# Patient Record
Sex: MALE | Race: WHITE | NOT HISPANIC OR LATINO | Employment: OTHER | URBAN - METROPOLITAN AREA
[De-identification: names, ages, dates, MRNs, and addresses within clinical notes are randomized per-mention and may not be internally consistent; named-entity substitution may affect disease eponyms.]

---

## 2023-03-21 LAB
ALANINE AMINOTRANSFERASE (SGPT) (U/L) IN SER/PLAS: 16 U/L (ref 10–52)
ALBUMIN (G/DL) IN SER/PLAS: 4.2 G/DL (ref 3.4–5)
ALKALINE PHOSPHATASE (U/L) IN SER/PLAS: 51 U/L (ref 33–136)
ANION GAP IN SER/PLAS: 17 MMOL/L (ref 10–20)
ASPARTATE AMINOTRANSFERASE (SGOT) (U/L) IN SER/PLAS: 14 U/L (ref 9–39)
BASOPHILS (10*3/UL) IN BLOOD BY AUTOMATED COUNT: 0.04 X10E9/L (ref 0–0.1)
BASOPHILS/100 LEUKOCYTES IN BLOOD BY AUTOMATED COUNT: 0.6 % (ref 0–2)
BILIRUBIN TOTAL (MG/DL) IN SER/PLAS: 1.5 MG/DL (ref 0–1.2)
CALCIUM (MG/DL) IN SER/PLAS: 9.6 MG/DL (ref 8.6–10.6)
CARBON DIOXIDE, TOTAL (MMOL/L) IN SER/PLAS: 24 MMOL/L (ref 21–32)
CHLORIDE (MMOL/L) IN SER/PLAS: 102 MMOL/L (ref 98–107)
CHOLESTEROL (MG/DL) IN SER/PLAS: 152 MG/DL (ref 0–199)
CHOLESTEROL IN HDL (MG/DL) IN SER/PLAS: 55.5 MG/DL
CHOLESTEROL/HDL RATIO: 2.7
CREATININE (MG/DL) IN SER/PLAS: 0.81 MG/DL (ref 0.5–1.3)
EOSINOPHILS (10*3/UL) IN BLOOD BY AUTOMATED COUNT: 0.2 X10E9/L (ref 0–0.4)
EOSINOPHILS/100 LEUKOCYTES IN BLOOD BY AUTOMATED COUNT: 3.1 % (ref 0–6)
ERYTHROCYTE DISTRIBUTION WIDTH (RATIO) BY AUTOMATED COUNT: 13.7 % (ref 11.5–14.5)
ERYTHROCYTE MEAN CORPUSCULAR HEMOGLOBIN CONCENTRATION (G/DL) BY AUTOMATED: 32.4 G/DL (ref 32–36)
ERYTHROCYTE MEAN CORPUSCULAR VOLUME (FL) BY AUTOMATED COUNT: 90 FL (ref 80–100)
ERYTHROCYTES (10*6/UL) IN BLOOD BY AUTOMATED COUNT: 3.89 X10E12/L (ref 4.5–5.9)
GFR MALE: >90 ML/MIN/1.73M2
GLUCOSE (MG/DL) IN SER/PLAS: 92 MG/DL (ref 74–99)
HEMATOCRIT (%) IN BLOOD BY AUTOMATED COUNT: 34.9 % (ref 41–52)
HEMOGLOBIN (G/DL) IN BLOOD: 11.3 G/DL (ref 13.5–17.5)
IMMATURE GRANULOCYTES/100 LEUKOCYTES IN BLOOD BY AUTOMATED COUNT: 0.3 % (ref 0–0.9)
LDL: 80 MG/DL (ref 0–99)
LEUKOCYTES (10*3/UL) IN BLOOD BY AUTOMATED COUNT: 6.4 X10E9/L (ref 4.4–11.3)
LYMPHOCYTES (10*3/UL) IN BLOOD BY AUTOMATED COUNT: 1.58 X10E9/L (ref 0.8–3)
LYMPHOCYTES/100 LEUKOCYTES IN BLOOD BY AUTOMATED COUNT: 24.8 % (ref 13–44)
MONOCYTES (10*3/UL) IN BLOOD BY AUTOMATED COUNT: 0.54 X10E9/L (ref 0.05–0.8)
MONOCYTES/100 LEUKOCYTES IN BLOOD BY AUTOMATED COUNT: 8.5 % (ref 2–10)
NEUTROPHILS (10*3/UL) IN BLOOD BY AUTOMATED COUNT: 3.98 X10E9/L (ref 1.6–5.5)
NEUTROPHILS/100 LEUKOCYTES IN BLOOD BY AUTOMATED COUNT: 62.7 % (ref 40–80)
NRBC (PER 100 WBCS) BY AUTOMATED COUNT: 0 /100 WBC (ref 0–0)
PLATELETS (10*3/UL) IN BLOOD AUTOMATED COUNT: 226 X10E9/L (ref 150–450)
POTASSIUM (MMOL/L) IN SER/PLAS: 3.8 MMOL/L (ref 3.5–5.3)
PROSTATE SPECIFIC AG (NG/ML) IN SER/PLAS: 1.19 NG/ML (ref 0–4)
PROTEIN TOTAL: 6.9 G/DL (ref 6.4–8.2)
SODIUM (MMOL/L) IN SER/PLAS: 139 MMOL/L (ref 136–145)
THYROTROPIN (MIU/L) IN SER/PLAS BY DETECTION LIMIT <= 0.05 MIU/L: 0.85 MIU/L (ref 0.44–3.98)
TRIGLYCERIDE (MG/DL) IN SER/PLAS: 85 MG/DL (ref 0–149)
UREA NITROGEN (MG/DL) IN SER/PLAS: 20 MG/DL (ref 6–23)
VLDL: 17 MG/DL (ref 0–40)

## 2023-04-26 ENCOUNTER — HOSPITAL ENCOUNTER (OUTPATIENT)
Dept: DATA CONVERSION | Facility: HOSPITAL | Age: 73
End: 2023-04-26
Attending: ANESTHESIOLOGY | Admitting: ANESTHESIOLOGY
Payer: MEDICARE

## 2023-04-26 DIAGNOSIS — M47.816 SPONDYLOSIS WITHOUT MYELOPATHY OR RADICULOPATHY, LUMBAR REGION: ICD-10-CM

## 2023-04-26 DIAGNOSIS — M54.16 RADICULOPATHY, LUMBAR REGION: ICD-10-CM

## 2023-08-15 LAB
6-ACETYLMORPHINE: <25 NG/ML
7-AMINOCLONAZEPAM: <25 NG/ML
ALPHA-HYDROXYALPRAZOLAM: <25 NG/ML
ALPHA-HYDROXYMIDAZOLAM: <25 NG/ML
ALPRAZOLAM: <25 NG/ML
AMPHETAMINE (PRESENCE) IN URINE BY SCREEN METHOD: ABNORMAL
BARBITURATES PRESENCE IN URINE BY SCREEN METHOD: ABNORMAL
CANNABINOIDS IN URINE BY SCREEN METHOD: ABNORMAL
CHLORDIAZEPOXIDE: <25 NG/ML
CLONAZEPAM: <25 NG/ML
COCAINE (PRESENCE) IN URINE BY SCREEN METHOD: ABNORMAL
CODEINE: <50 NG/ML
CREATINE, URINE FOR DRUG: 30.3 MG/DL
DIAZEPAM: <25 NG/ML
DRUG SCREEN COMMENT URINE: ABNORMAL
EDDP: <25 NG/ML
FENTANYL CONFIRMATION, URINE: <2.5 NG/ML
HYDROCODONE: <25 NG/ML
HYDROMORPHONE: <25 NG/ML
LORAZEPAM: <25 NG/ML
METHADONE CONFIRMATION,URINE: <25 NG/ML
MIDAZOLAM: <25 NG/ML
MORPHINE URINE: <50 NG/ML
NORDIAZEPAM: <25 NG/ML
NORFENTANYL: <2.5 NG/ML
NORHYDROCODONE: <25 NG/ML
NOROXYCODONE: 95 NG/ML
O-DESMETHYLTRAMADOL: <50 NG/ML
OXAZEPAM: <25 NG/ML
OXYCODONE: 39 NG/ML
OXYMORPHONE: 206 NG/ML
PHENCYCLIDINE (PRESENCE) IN URINE BY SCREEN METHOD: ABNORMAL
TEMAZEPAM: <25 NG/ML
TRAMADOL: <50 NG/ML
ZOLPIDEM METABOLITE (ZCA): <25 NG/ML
ZOLPIDEM: <25 NG/ML

## 2023-09-07 VITALS — BODY MASS INDEX: 22.81 KG/M2 | HEIGHT: 71 IN | WEIGHT: 162.92 LBS

## 2023-10-02 NOTE — OP NOTE
Post Operative Note:     PreOp Diagnosis: Lumbar spondylosis without myelopathy   Post-Procedure Diagnosis: Same   Procedure: 1.   2.  Radiofrequency ablation lumbar medial branch nerves L4-5 L5-S1 with fluoroscopy  3.   4.   5.   Surgeon: Dr. Taylor   Resident/Fellow/Other Assistant: None   Anesthesia: Local   Estimated Blood Loss (mL): none   Specimen: no   Findings: None     Operative Report Dictated:  Dictation: not applicable - note contains Operative  Report   Operative Report:    73-year-old  male here today for radiofrequency ablation lumbar medial branch nerves L4-5 L5-S1 with fluoroscopy.  He has responded well to medial branch  nerve blocks.  Risk and benefits of today's procedure as well as COVID-19 was discussed and a signed consent was obtained prior to entry into the OR.  Preoperative Diagnosis: Lumbar spondylosis without myelopathy L4-5 L5-S1    Operation Performed:   1. Bilateral Percutaneous localization of medial branch nerves of L4-5 L5-S1 with fluoroscopy/neural stimulation.    2. Lumbar radiofrequency ablation of the above-mentioned medial branch nerves.      Medical necessity was determined by the above-diagnosis.     Anesthesia: Local/IV Conscious Sedation    Estimated Blood Loss: none    Complications: none    CLINICAL NOTE: This patient was admitted to the pain center to undergo bilateral radiofrequency ablation of the lumbar medial branch nerves L4-5 L5-S1. The patient has had a history of severe low back pain for the past 6 months. The pain is axial in nature,  involving the buttocks and posterior thigh. Prior to being taken to the operating room, all risks and potential outcomes, side effects and complications to include dural puncture, nerve damage, bleeding, increased pain, and infection, were re-explained  to the patient, along with the rationale to perform the procedure. The patient voluntarily signed a consent form and no guarantees were made.      PROCEDURE: The  patient was taken to the operating room and placed in the prone position with proper monitoring of vital signs. Monitored anesthesia care was administered so that the patient could interact with me during the procedure.  No IV conscious  sedation was used to allay anxiety. The patient was prepped and draped in the usual sterile fashion. This skin and subcutaneous tissue were anesthetized with a 25-gauge 1-1/4 inch needle using 14 cc's of 1% lidocaine. Using multiplanar fluoroscopy guidance,  the medial branch nerves (MBN's) were individually needle-localized at L4-5 L5-S1 with a 18-gauge angulated Networker 10 mm exposed tip venom needle. I correctly placed the needle tip at the junction of the superior articular process and the transverse  process, where the mammilloaccessory ligament is located. Each RFA was performed with the exposed needle tip safely over and contacting bony margins. To further clarify and localize the anatomical targets, neurosensory stimulation of the medial branch  nerves was carried out between 50 and 75 cycles per second. I detected paraspinal muscle twitching without radicular or motor responses when stimulating for motor nerves at 2 cps up to 2 V. Appropriate sensory responses without radicular motor response  confirmed the needle tip was away from respective nerve root elements. After this was confirmed at each individual level being treated, 1 mL of 2% lidocaine was injected; Then each of the 4 medial branch nerves was individually ablated at 85°C for  60 seconds. The needles were then rotated 90° and a second RF a was performed at 85° for 60 seconds. This was followed by injecting 20 mg of Depo-Medrol +1 mL of 2% lidocaine. This was performed and both levels post ablation to overt the potential  for postoperative neuritis. The needles were then removed and sterile bandages with Neosporin ointment were applied to the puncture sites. A total of 4 sites were ablated with radiofrequency  ablation.    The patient tolerated the procedure well and remained both hemodynamically stable and neurologically intact and was transferred to the recovery room in stable condition. The patient was discharged with instructions to follow-up in the clinic in 6 weeks.          Electronic Signatures:  Mikey Taylor ()  (Signed 26-Apr-2023 11:13)   Authored: Post Operative Note, Note Completion      Last Updated: 26-Apr-2023 11:13 by Mikey Taylor ()

## 2023-11-01 ENCOUNTER — OFFICE VISIT (OUTPATIENT)
Dept: PAIN MEDICINE | Facility: CLINIC | Age: 73
End: 2023-11-01
Payer: MEDICARE

## 2023-11-01 VITALS
HEIGHT: 71 IN | DIASTOLIC BLOOD PRESSURE: 64 MMHG | TEMPERATURE: 97 F | HEART RATE: 64 BPM | SYSTOLIC BLOOD PRESSURE: 137 MMHG | RESPIRATION RATE: 18 BRPM | WEIGHT: 162 LBS | BODY MASS INDEX: 22.68 KG/M2

## 2023-11-01 DIAGNOSIS — M43.06 LUMBAR SPONDYLOLYSIS: ICD-10-CM

## 2023-11-01 DIAGNOSIS — M54.16 LUMBAR RADICULOPATHY: ICD-10-CM

## 2023-11-01 DIAGNOSIS — M47.817 MULTILEVEL LUMBOSACRAL SPONDYLOSIS WITHOUT MYELOPATHY: Primary | ICD-10-CM

## 2023-11-01 DIAGNOSIS — M96.1 POSTLAMINECTOMY SYNDROME, LUMBAR REGION: ICD-10-CM

## 2023-11-01 PROCEDURE — 1125F AMNT PAIN NOTED PAIN PRSNT: CPT | Performed by: ANESTHESIOLOGY

## 2023-11-01 PROCEDURE — 1159F MED LIST DOCD IN RCRD: CPT | Performed by: ANESTHESIOLOGY

## 2023-11-01 PROCEDURE — 99213 OFFICE O/P EST LOW 20 MIN: CPT | Performed by: ANESTHESIOLOGY

## 2023-11-01 PROCEDURE — 1036F TOBACCO NON-USER: CPT | Performed by: ANESTHESIOLOGY

## 2023-11-01 RX ORDER — LIDOCAINE 50 MG/G
PATCH TOPICAL
COMMUNITY
Start: 2022-07-27 | End: 2023-12-04 | Stop reason: WASHOUT

## 2023-11-01 RX ORDER — LOSARTAN POTASSIUM 100 MG/1
1 TABLET ORAL DAILY
COMMUNITY
Start: 2021-04-15

## 2023-11-01 RX ORDER — BUPROPION HYDROCHLORIDE 150 MG/1
150 TABLET, EXTENDED RELEASE ORAL 2 TIMES DAILY
COMMUNITY
Start: 2023-03-20 | End: 2023-12-04 | Stop reason: WASHOUT

## 2023-11-01 RX ORDER — AMITRIPTYLINE HYDROCHLORIDE 10 MG/1
1 TABLET, FILM COATED ORAL NIGHTLY
COMMUNITY
Start: 2022-08-31 | End: 2023-12-04 | Stop reason: WASHOUT

## 2023-11-01 RX ORDER — SILDENAFIL 100 MG/1
TABLET, FILM COATED ORAL AS NEEDED
COMMUNITY

## 2023-11-01 RX ORDER — SOTALOL HYDROCHLORIDE 80 MG/1
1 TABLET ORAL EVERY 12 HOURS
COMMUNITY
Start: 2017-03-08 | End: 2024-04-23

## 2023-11-01 RX ORDER — NALOXONE HYDROCHLORIDE 4 MG/.1ML
SPRAY NASAL
COMMUNITY
Start: 2021-11-17

## 2023-11-01 RX ORDER — ACETAMINOPHEN 500 MG
TABLET ORAL EVERY 6 HOURS PRN
COMMUNITY

## 2023-11-01 RX ORDER — ROSUVASTATIN CALCIUM 40 MG/1
TABLET, COATED ORAL
COMMUNITY
Start: 2023-09-05 | End: 2024-05-29 | Stop reason: SDUPTHER

## 2023-11-01 RX ORDER — FUROSEMIDE 20 MG/1
1 TABLET ORAL DAILY
COMMUNITY
Start: 2021-04-15 | End: 2024-03-11

## 2023-11-01 RX ORDER — AMLODIPINE BESYLATE 5 MG/1
1 TABLET ORAL DAILY
COMMUNITY
Start: 2017-03-08 | End: 2024-03-11

## 2023-11-01 RX ORDER — OXYCODONE AND ACETAMINOPHEN 5; 325 MG/1; MG/1
TABLET ORAL
COMMUNITY
End: 2023-11-01 | Stop reason: SDUPTHER

## 2023-11-01 RX ORDER — CETIRIZINE HYDROCHLORIDE 10 MG/1
1 TABLET ORAL DAILY
COMMUNITY

## 2023-11-01 RX ORDER — OXYCODONE AND ACETAMINOPHEN 5; 325 MG/1; MG/1
1 TABLET ORAL EVERY 8 HOURS PRN
Qty: 90 TABLET | Refills: 0 | Status: SHIPPED | OUTPATIENT
Start: 2023-11-01 | End: 2023-12-01

## 2023-11-01 SDOH — ECONOMIC STABILITY: FOOD INSECURITY: WITHIN THE PAST 12 MONTHS, YOU WORRIED THAT YOUR FOOD WOULD RUN OUT BEFORE YOU GOT MONEY TO BUY MORE.: NEVER TRUE

## 2023-11-01 SDOH — ECONOMIC STABILITY: FOOD INSECURITY: WITHIN THE PAST 12 MONTHS, THE FOOD YOU BOUGHT JUST DIDN'T LAST AND YOU DIDN'T HAVE MONEY TO GET MORE.: NEVER TRUE

## 2023-11-01 ASSESSMENT — COLUMBIA-SUICIDE SEVERITY RATING SCALE - C-SSRS
6. HAVE YOU EVER DONE ANYTHING, STARTED TO DO ANYTHING, OR PREPARED TO DO ANYTHING TO END YOUR LIFE?: NO
2. HAVE YOU ACTUALLY HAD ANY THOUGHTS OF KILLING YOURSELF?: NO
1. IN THE PAST MONTH, HAVE YOU WISHED YOU WERE DEAD OR WISHED YOU COULD GO TO SLEEP AND NOT WAKE UP?: NO

## 2023-11-01 ASSESSMENT — PATIENT HEALTH QUESTIONNAIRE - PHQ9
2. FEELING DOWN, DEPRESSED OR HOPELESS: NOT AT ALL
1. LITTLE INTEREST OR PLEASURE IN DOING THINGS: NOT AT ALL
SUM OF ALL RESPONSES TO PHQ9 QUESTIONS 1 & 2: 0

## 2023-11-01 ASSESSMENT — LIFESTYLE VARIABLES: HOW OFTEN DO YOU HAVE A DRINK CONTAINING ALCOHOL: NEVER

## 2023-11-01 ASSESSMENT — ENCOUNTER SYMPTOMS
MYALGIAS: 1
BACK PAIN: 1

## 2023-11-01 ASSESSMENT — PAIN SCALES - GENERAL: PAINLEVEL: 8

## 2023-11-01 NOTE — PROGRESS NOTES
Subjective   Gaurav Rehman is a 73 y.o. male who returns for follow-up medication management.  Patient takes 2 Percocet 5/325 mg tablets a day to moderate his pain.  Analgesia: Pain level is 5-6 on a 1-10 scale which is stable  Activity Level: Patient is able to perform activities of daily living without great difficulty when using his medications on a daily basis.    Review of Systems   Musculoskeletal:  Positive for back pain, gait problem and myalgias.   All other systems reviewed and are negative.      Objective   Physical Exam   Gen. appearance:  Patient's alert and oriented ×3 ;cooperative mild to moderate distress  Heart: Regular rate and rhythm  Lungs: Clear to auscultation  Abdomen: Soft nontender  Neuro: Cranial nerves II -XII intact; DTR: +2 over 4 biceps triceps brachial radialis patellar and Achilles  Spinal exam: Positive paraspinal tenderness noted bilaterally L4 5 L5-S1 with flexion extension and rotation/no bony abnormalities  Musculoskeletal:  Upper and lower extremity strength was 4/5 bilaterally  :  deferred  Skin: Warm and dry      Assessment:   1. Lumbar radiculopathy        2. Postlaminectomy syndrome, lumbar region            Plan:  Risk and benefits continuous opioids for pain control was discussed.  Patient was E prescribed Percocet 5/325 mg tablets #90 which is 2 a day for 7-week follow-up visit for a scheduled radiofrequency ablation lumbar medial branch nerves on December 20, 2023.

## 2023-11-01 NOTE — PROGRESS NOTES
Pain #8/10 to his back, right hip going down the leg that comes and goes.  This is chronic.  Pain increases with yard work, chores. Takes 2 Percocet per day and has 6 tablets left.  This reduces his pain by 75 %.

## 2023-12-01 PROBLEM — M47.812 FACET ARTHRITIS OF CERVICAL REGION: Status: ACTIVE | Noted: 2023-12-01

## 2023-12-01 PROBLEM — M47.816 FACET DEGENERATION OF LUMBAR REGION: Status: ACTIVE | Noted: 2023-12-01

## 2023-12-01 PROBLEM — M25.551 ARTHRALGIA OF HIP, RIGHT: Status: ACTIVE | Noted: 2023-12-01

## 2023-12-01 PROBLEM — I10 HYPERTENSION: Status: ACTIVE | Noted: 2023-12-01

## 2023-12-01 PROBLEM — B02.29 POST HERPETIC NEURALGIA: Status: ACTIVE | Noted: 2023-12-01

## 2023-12-01 PROBLEM — I47.19 PAROXYSMAL ATRIAL TACHYCARDIA (CMS-HCC): Status: ACTIVE | Noted: 2023-12-01

## 2023-12-01 PROBLEM — I71.21 ASCENDING AORTIC ANEURYSM (CMS-HCC): Status: ACTIVE | Noted: 2023-12-01

## 2023-12-01 PROBLEM — R53.83 FATIGUE: Status: ACTIVE | Noted: 2023-12-01

## 2023-12-01 PROBLEM — E78.5 HYPERLIPIDEMIA: Status: ACTIVE | Noted: 2023-12-01

## 2023-12-01 PROBLEM — K59.00 CONSTIPATION: Status: ACTIVE | Noted: 2023-12-01

## 2023-12-01 PROBLEM — I07.1 MODERATE TRICUSPID REGURGITATION: Status: ACTIVE | Noted: 2023-12-01

## 2023-12-01 PROBLEM — I35.1 MILD AORTIC REGURGITATION: Status: ACTIVE | Noted: 2023-12-01

## 2023-12-01 PROBLEM — R06.02 SHORTNESS OF BREATH: Status: ACTIVE | Noted: 2023-12-01

## 2023-12-01 PROBLEM — I34.0 MODERATE MITRAL REGURGITATION: Status: ACTIVE | Noted: 2023-12-01

## 2023-12-03 NOTE — PROGRESS NOTES
Subjective   Gaurav Rehman is a 73 y.o. male.    Chief Complaint:  Follow-up valvular heart disease.  Follow-up sinus of Valsalva aneurysm.    HPI:    Over the past 6 months he has done well.  No chest discomfort or chest pressure.  Remains active and does bowl on a regular basis.  Tries to get uncertain level of exercising every day.  Weights have been stable.  Watches his diet.  No hospitalizations or other medical problems since his last visit.    Cardiac problems include moderate mitral regurgitation, moderate tricuspid regurgitation, supraventricular tachycardia, ascending aortic aneurysm, mild aortic regurgitation.     The patient has a history of sinus of Valsalva aneurysm. This measures around 4.7 cm. This has remained unchanged. He has a paroxysmal atrial tachycardia which is well controlled on sotalol therapy. He has a history of hypertension and hyperlipidemia.     He has a past history of a paroxysmal atrial tachycardia. He was referred for ablation a few years ago. However this was not successful. He was started on sotalol therapy and has been on sotalol therapy for a few years.     He has a history of complex congenital heart repair in 1995. He had a repair of ventricular septal defect. He had treatment of pulmonic stenosis. He had aortic root surgery with implantation of the coronary arteries.    Allergies  Medication    · No Known Drug Allergies   Recorded By: Laurie Spears; 11/16/2017 11:52:21 AM     Family History  Mother    · Family history of emphysema (V17.6) (Z82.5)   · Family history of throat cancer (V16.0) (Z80.0)  Father    · Family history of throat cancer (V16.0) (Z80.0)  Other    · No family history of cardiac disease     Social History  Problems    · Former smoker (V15.82) (Z87.891)   ·    · No illicit drug use   · Occasional alcohol use      Review of Systems   Cardiovascular:  Positive for dyspnea on exertion.   Musculoskeletal:  Positive for arthritis.   All other systems  reviewed and are negative.      Visit Vitals  /78 (BP Location: Right arm, Patient Position: Sitting)   Pulse 70   Wt 79.4 kg (175 lb)   SpO2 97%   BMI 24.41 kg/m²   Smoking Status Never   BSA 1.99 m²        Objective     Constitutional:       Appearance: Not in distress.   Neck:      Vascular: JVD normal.   Pulmonary:      Breath sounds: Normal breath sounds.   Cardiovascular:      Normal rate. Regular rhythm. S1 with normal intensity. S2 with normal intensity.       Murmurs: There is no murmur.      No gallop.    Pulses:     Intact distal pulses.   Edema:     Peripheral edema absent.   Abdominal:      General: Bowel sounds are normal.   Neurological:      Mental Status: Alert and oriented to person, place and time.         Lab Review:   Lab Results   Component Value Date     03/21/2023    K 3.8 03/21/2023     03/21/2023    CO2 24 03/21/2023    BUN 20 03/21/2023    CREATININE 0.81 03/21/2023    GLUCOSE 92 03/21/2023    CALCIUM 9.6 03/21/2023     Lab Results   Component Value Date    CHOL 152 03/21/2023    TRIG 85 03/21/2023    HDL 55.5 03/21/2023       Assessment:    1.  Sinus Valsalva aneurysm.  Will repeat echo study on his next visit.    2.  Paroxysmal atrial tachycardia.  On sotalol therapy.  Tolerating it well.  Has not had any breakthrough arrhythmias.    3.  Hyperlipidemia.  Most recent LDL is 80.  Previous LDL was 60.  He is on rosuvastatin 40 mg daily.    4.  Hypertension.  Blood pressures are normal.    5.  Valvular heart disease.  Moderate mitral regurgitation and moderate tricuspid regurgitation.  No evidence of left heart failure or right heart failure on today's examination.

## 2023-12-04 ENCOUNTER — OFFICE VISIT (OUTPATIENT)
Dept: CARDIOLOGY | Facility: CLINIC | Age: 73
End: 2023-12-04
Payer: MEDICARE

## 2023-12-04 VITALS
OXYGEN SATURATION: 97 % | DIASTOLIC BLOOD PRESSURE: 78 MMHG | HEART RATE: 70 BPM | BODY MASS INDEX: 24.41 KG/M2 | WEIGHT: 175 LBS | SYSTOLIC BLOOD PRESSURE: 122 MMHG

## 2023-12-04 DIAGNOSIS — E78.49 OTHER HYPERLIPIDEMIA: ICD-10-CM

## 2023-12-04 DIAGNOSIS — I34.0 MODERATE MITRAL REGURGITATION: ICD-10-CM

## 2023-12-04 DIAGNOSIS — I35.1 MILD AORTIC REGURGITATION: ICD-10-CM

## 2023-12-04 DIAGNOSIS — I71.21 ANEURYSM OF ASCENDING AORTA WITHOUT RUPTURE (CMS-HCC): Primary | ICD-10-CM

## 2023-12-04 DIAGNOSIS — I07.1 MODERATE TRICUSPID REGURGITATION: ICD-10-CM

## 2023-12-04 DIAGNOSIS — I47.19 PAROXYSMAL ATRIAL TACHYCARDIA (CMS-HCC): ICD-10-CM

## 2023-12-04 PROCEDURE — 1125F AMNT PAIN NOTED PAIN PRSNT: CPT | Performed by: INTERNAL MEDICINE

## 2023-12-04 PROCEDURE — 3074F SYST BP LT 130 MM HG: CPT | Performed by: INTERNAL MEDICINE

## 2023-12-04 PROCEDURE — 1159F MED LIST DOCD IN RCRD: CPT | Performed by: INTERNAL MEDICINE

## 2023-12-04 PROCEDURE — 1036F TOBACCO NON-USER: CPT | Performed by: INTERNAL MEDICINE

## 2023-12-04 PROCEDURE — 99214 OFFICE O/P EST MOD 30 MIN: CPT | Performed by: INTERNAL MEDICINE

## 2023-12-04 PROCEDURE — 3078F DIAST BP <80 MM HG: CPT | Performed by: INTERNAL MEDICINE

## 2023-12-04 ASSESSMENT — ENCOUNTER SYMPTOMS: DYSPNEA ON EXERTION: 1

## 2023-12-20 ENCOUNTER — HOSPITAL ENCOUNTER (OUTPATIENT)
Dept: PAIN MEDICINE | Facility: CLINIC | Age: 73
Discharge: HOME | End: 2023-12-20
Payer: MEDICARE

## 2023-12-20 ENCOUNTER — ANCILLARY PROCEDURE (OUTPATIENT)
Dept: RADIOLOGY | Facility: CLINIC | Age: 73
End: 2023-12-20
Payer: MEDICARE

## 2023-12-20 VITALS
OXYGEN SATURATION: 98 % | HEART RATE: 74 BPM | TEMPERATURE: 97.7 F | DIASTOLIC BLOOD PRESSURE: 69 MMHG | SYSTOLIC BLOOD PRESSURE: 127 MMHG | RESPIRATION RATE: 18 BRPM

## 2023-12-20 DIAGNOSIS — M47.817 MULTILEVEL LUMBOSACRAL SPONDYLOSIS WITHOUT MYELOPATHY: ICD-10-CM

## 2023-12-20 DIAGNOSIS — M96.1 POSTLAMINECTOMY SYNDROME, LUMBAR REGION: ICD-10-CM

## 2023-12-20 DIAGNOSIS — M54.16 RADICULOPATHY, LUMBAR REGION: ICD-10-CM

## 2023-12-20 PROCEDURE — 77003 FLUOROGUIDE FOR SPINE INJECT: CPT

## 2023-12-20 PROCEDURE — 2500000005 HC RX 250 GENERAL PHARMACY W/O HCPCS

## 2023-12-20 PROCEDURE — 64635 DESTROY LUMB/SAC FACET JNT: CPT | Performed by: ANESTHESIOLOGY

## 2023-12-20 PROCEDURE — 2500000004 HC RX 250 GENERAL PHARMACY W/ HCPCS (ALT 636 FOR OP/ED)

## 2023-12-20 PROCEDURE — 64636 DESTROY L/S FACET JNT ADDL: CPT | Performed by: ANESTHESIOLOGY

## 2023-12-20 PROCEDURE — 64636 DESTROY L/S FACET JNT ADDL: CPT | Mod: 50,KX | Performed by: ANESTHESIOLOGY

## 2023-12-20 PROCEDURE — 64635 DESTROY LUMB/SAC FACET JNT: CPT | Mod: 50 | Performed by: ANESTHESIOLOGY

## 2023-12-20 RX ORDER — LIDOCAINE HYDROCHLORIDE 20 MG/ML
INJECTION, SOLUTION EPIDURAL; INFILTRATION; INTRACAUDAL; PERINEURAL
Status: COMPLETED
Start: 2023-12-20 | End: 2023-12-20

## 2023-12-20 RX ORDER — LIDOCAINE HYDROCHLORIDE 10 MG/ML
INJECTION, SOLUTION EPIDURAL; INFILTRATION; INTRACAUDAL; PERINEURAL
Status: COMPLETED
Start: 2023-12-20 | End: 2023-12-20

## 2023-12-20 RX ORDER — OXYCODONE AND ACETAMINOPHEN 5; 325 MG/1; MG/1
TABLET ORAL
COMMUNITY
End: 2023-12-20 | Stop reason: SDUPTHER

## 2023-12-20 RX ORDER — METHYLPREDNISOLONE ACETATE 80 MG/ML
INJECTION, SUSPENSION INTRA-ARTICULAR; INTRALESIONAL; INTRAMUSCULAR; SOFT TISSUE
Status: COMPLETED
Start: 2023-12-20 | End: 2023-12-20

## 2023-12-20 RX ORDER — OXYCODONE AND ACETAMINOPHEN 5; 325 MG/1; MG/1
1 TABLET ORAL EVERY 8 HOURS PRN
Qty: 90 TABLET | Refills: 0 | Status: SHIPPED | OUTPATIENT
Start: 2023-12-20 | End: 2024-01-19

## 2023-12-20 RX ADMIN — METHYLPREDNISOLONE ACETATE 80 MG: 80 INJECTION, SUSPENSION INTRA-ARTICULAR; INTRALESIONAL; INTRAMUSCULAR; SOFT TISSUE at 11:02

## 2023-12-20 RX ADMIN — LIDOCAINE HYDROCHLORIDE 300 MG: 10 INJECTION, SOLUTION EPIDURAL; INFILTRATION; INTRACAUDAL; PERINEURAL at 11:01

## 2023-12-20 RX ADMIN — LIDOCAINE HYDROCHLORIDE 100 MG: 20 INJECTION, SOLUTION EPIDURAL; INFILTRATION; INTRACAUDAL; PERINEURAL at 11:02

## 2023-12-20 ASSESSMENT — PAIN - FUNCTIONAL ASSESSMENT: PAIN_FUNCTIONAL_ASSESSMENT: 0-10

## 2023-12-20 ASSESSMENT — PAIN SCALES - GENERAL
PAINLEVEL_OUTOF10: 7
PAINLEVEL_OUTOF10: 8

## 2023-12-20 NOTE — OP NOTE
Date: 2023  OR Location: [facility] NON-OR PROCEDURES    Name: Gaurav Rehman, : 1950, Age: 73 y.o., MRN: [id number], Sex: male    Diagnosis   1. Postlaminectomy syndrome, lumbar region  Radiofrequency Ablation    Radiofrequency Ablation      2. Multilevel lumbosacral spondylosis without myelopathy  Radiofrequency Ablation    Radiofrequency Ablation        Preoperative Diagnosis: Lumbar spondylosis without myelopathy L4-5 L5-S1    Operation Performed:   1. Bilateral Percutaneous localization of medial branch nerves of L4-5 L5-S1 with fluoroscopy/neural stimulation.    2. Lumbar radiofrequency ablation of the above-mentioned medial branch nerves.      Procedures     Medical necessity was determined by the above-diagnosis.     Anesthesia: Local rehab    Estimated Blood Loss: none    Complications: none    CLINICAL NOTE: This patient was admitted to the pain center to undergo bilateral radiofrequency ablation of the lumbar medial branch nerves L4-5 L5-S1 the patient has had a history of severe low back pain for the past 10 months. The pain is axial in nature, involving the buttocks and posterior thigh. Prior to being taken to the operating room, all risks and potential outcomes, side effects and complications to include dural puncture, nerve damage, bleeding, increased pain, and infection, were re-explained to the patient, along with the rationale to perform the procedure. The patient voluntarily signed a consent form and no guarantees were made.      PROCEDURE: The 73 y.o. male patient was taken to the operating room and placed in the prone position with proper monitoring of vital signs. Monitored anesthesia care was administered so that the patient could interact with me during the procedure.  No IV conscious sedation was used to allay anxiety. The patient was prepped and draped in the usual sterile fashion. This skin and subcutaneous tissue were anesthetized with a 25-gauge 1-1/4 inch needle using 14 cc's of 1%  lidocaine. Using multiplanar fluoroscopy guidance, the medial branch nerves (MBN's) were individually needle-localized at L4-5 L5-S1 with a 18-gauge angulated Flashstock 10 mm exposed tip venom needle. I correctly placed the needle tip at the junction of the superior articular process and the transverse process, where the mammilloaccessory ligament is located. Each RFA was performed with the exposed needle tip safely over and contacting bony margins. To further clarify and localize the anatomical targets, neurosensory stimulation of the medial branch nerves was carried out between 50 and 75 cycles per second. I detected paraspinal muscle twitching without radicular or motor responses when stimulating for motor nerves at 2 cps up to 2 V. Appropriate sensory responses without radicular motor response confirmed the needle tip was away from respective nerve root elements. After this was confirmed at each individual level being treated, 1 mL of 2% lidocaine was injected; Then each of the 4 medial branch nerves was individually ablated at 85°C for 60 seconds. The needles were then rotated 90° and a second RF a was performed at 85° for 60 seconds. This was followed by injecting 20 mg of Depo-Medrol +1 mL of 2% lidocaine. This was performed and both levels post ablation to overt the potential for postoperative neuritis. The needles were then removed and sterile bandages with Neosporin ointment were applied to the puncture sites. A total of 4 sites were ablated with radiofrequency ablation.    The patient tolerated the procedure well and remained both hemodynamically stable and neurologically intact and was transferred to the recovery room in stable condition. The patient was discharged with instructions to follow-up in the clinic in 4-6 weeks.

## 2023-12-20 NOTE — H&P
History Of Present Illness  Gaurav Rehman is a 73 y.o. male presenting with chronic low back pain.     Past Medical History  Past Medical History:   Diagnosis Date    Essential (primary) hypertension 12/18/2022    Hypertension, unspecified type       Surgical History  Past Surgical History:   Procedure Laterality Date    BACK SURGERY  11/16/2017    Back Surgery    OTHER SURGICAL HISTORY  11/16/2017    Aortic Root Transloc, Repair VSD & Pulm Stenosis, Coronary Ostium Reimplant    ROTATOR CUFF REPAIR  11/16/2017    Rotator Cuff Repair        Social History  He reports that he has never smoked. He has never used smokeless tobacco. He reports that he does not currently use alcohol. He reports current drug use. Drug: Oxycodone.    Family History  No family history on file.     Allergies  Patient has no known allergies.    Review of Systems     Physical Exam     Last Recorded Vitals  There were no vitals taken for this visit.    Relevant Results        Denies any chest pain or shortness of breath or changes in his health since his last visit on 11/1/2023.  Risk and benefits of radiofrequency ablation of the lumbar medial branch nerves was discussed with the patient at length and he voluntarily signed a consent form prior to entering the OR.     Assessment/Plan   Active Problems:  There are no active Hospital Problems.      Patient has diagnostic medial branch nerve blocks done previously with 80% pain relief.  Patient scheduled today for RFA of his medial branch nerves.  Electronic prescription of Percocet 5 mg tablets was sent to his pharmacy.       I spent 10 minutes in the professional and overall care of this patient.      Mikey Taylor DO

## 2024-01-31 ENCOUNTER — OFFICE VISIT (OUTPATIENT)
Dept: PAIN MEDICINE | Facility: CLINIC | Age: 74
End: 2024-01-31
Payer: MEDICARE

## 2024-01-31 VITALS
TEMPERATURE: 97.2 F | BODY MASS INDEX: 22.4 KG/M2 | WEIGHT: 160 LBS | SYSTOLIC BLOOD PRESSURE: 160 MMHG | HEART RATE: 63 BPM | RESPIRATION RATE: 18 BRPM | HEIGHT: 71 IN | DIASTOLIC BLOOD PRESSURE: 68 MMHG

## 2024-01-31 DIAGNOSIS — M47.817 MULTILEVEL LUMBOSACRAL SPONDYLOSIS WITHOUT MYELOPATHY: Primary | ICD-10-CM

## 2024-01-31 DIAGNOSIS — M96.1 POSTLAMINECTOMY SYNDROME, LUMBAR REGION: ICD-10-CM

## 2024-01-31 PROCEDURE — 1036F TOBACCO NON-USER: CPT | Performed by: ANESTHESIOLOGY

## 2024-01-31 PROCEDURE — 1159F MED LIST DOCD IN RCRD: CPT | Performed by: ANESTHESIOLOGY

## 2024-01-31 PROCEDURE — 1125F AMNT PAIN NOTED PAIN PRSNT: CPT | Performed by: ANESTHESIOLOGY

## 2024-01-31 PROCEDURE — 3077F SYST BP >= 140 MM HG: CPT | Performed by: ANESTHESIOLOGY

## 2024-01-31 PROCEDURE — 99213 OFFICE O/P EST LOW 20 MIN: CPT | Performed by: ANESTHESIOLOGY

## 2024-01-31 PROCEDURE — 1160F RVW MEDS BY RX/DR IN RCRD: CPT | Performed by: ANESTHESIOLOGY

## 2024-01-31 PROCEDURE — 3078F DIAST BP <80 MM HG: CPT | Performed by: ANESTHESIOLOGY

## 2024-01-31 RX ORDER — OXYCODONE AND ACETAMINOPHEN 5; 325 MG/1; MG/1
1 TABLET ORAL EVERY 8 HOURS PRN
Qty: 90 TABLET | Refills: 0 | Status: SHIPPED | OUTPATIENT
Start: 2024-01-31 | End: 2024-03-01

## 2024-01-31 ASSESSMENT — ENCOUNTER SYMPTOMS
LOSS OF SENSATION IN FEET: 0
OCCASIONAL FEELINGS OF UNSTEADINESS: 0
DEPRESSION: 0

## 2024-01-31 ASSESSMENT — COLUMBIA-SUICIDE SEVERITY RATING SCALE - C-SSRS
1. IN THE PAST MONTH, HAVE YOU WISHED YOU WERE DEAD OR WISHED YOU COULD GO TO SLEEP AND NOT WAKE UP?: NO
2. HAVE YOU ACTUALLY HAD ANY THOUGHTS OF KILLING YOURSELF?: NO
6. HAVE YOU EVER DONE ANYTHING, STARTED TO DO ANYTHING, OR PREPARED TO DO ANYTHING TO END YOUR LIFE?: NO

## 2024-01-31 ASSESSMENT — PAIN SCALES - GENERAL: PAINLEVEL: 8

## 2024-01-31 NOTE — PROGRESS NOTES
Pain #8/10 lower back, right hip.  RFA on 12-20-23 gave him only a couple of weeks relief and pain has now returned.  Pain increases with getting out of bed in the morning and any strenuous activity.  Takes 2 Percocet per day and has 6 tablets left.  This reduces his pain by at least 70%.

## 2024-01-31 NOTE — PROGRESS NOTES
Subjective   Gaurav Rehman is a 73 y.o. male who returns for follow-up following a radiofrequency ablation lumbar medial branch nerves at L4-5 L5-S1 on 12/20/2023.  Patient has had significant chronic low back pain secondary to facet arthrosis as well is failed lumbar laminectomy.  He takes Percocet 2 a day to moderate his pain.  OARRS report shows his active cumulated morphine equivalent of 12.8.  Analgesia: Pain level is 5-7 on a 1-10 scale which is stable.  Activity Level: Activity level is normal when using his medication on a regular basis.  Patient also uses extra strength Tylenol for breakthrough pain    Review of Systems  Unremarkable except for chief complaint    Objective   Physical Exam   Gen. appearance:  Patient's alert and oriented ×3 ;cooperative mild to moderate distress  Heart: Regular rate and rhythm  Lungs: Clear to auscultation  Abdomen: Soft nontender  Neuro: Cranial nerves II -XII intact; DTR: +2 over 4 biceps triceps brachial radialis patellar and Achilles  Spinal exam: Positive paraspinal tenderness noted bilaterally L4 5 L5-S1 with flexion extension and rotation/no bony abnormalities  Musculoskeletal:  Upper and lower extremity strength was 4/5 bilaterally  :  deferred  Skin: Warm and dry      Assessment:   1. Multilevel lumbosacral spondylosis without myelopathy  oxyCODONE-acetaminophen (Percocet) 5-325 mg tablet    Follow Up In Pain Medicine      2. Postlaminectomy syndrome, lumbar region  oxyCODONE-acetaminophen (Percocet) 5-325 mg tablet    Follow Up In Pain Medicine          Plan:  Risk and benefits of continued use of opioids for pain management was discussed.  Patient was e-prescribed Percocet 5/325 mg tablets #90 or 1 every 8-12 hours as needed pain.  He has not follow-up visit scheduled on March 14, 2024.  Patient was specifically instructed to keep all medication in a secure location at all times and to take them only as directed.     5-Fu Pregnancy And Lactation Text: This medication is Pregnancy Category X and contraindicated in pregnancy and in women who may become pregnant. It is unknown if this medication is excreted in breast milk.

## 2024-02-01 ENCOUNTER — APPOINTMENT (OUTPATIENT)
Dept: PAIN MEDICINE | Facility: CLINIC | Age: 74
End: 2024-02-01
Payer: MEDICARE

## 2024-03-10 DIAGNOSIS — I10 PRIMARY HYPERTENSION: ICD-10-CM

## 2024-03-11 RX ORDER — AMLODIPINE BESYLATE 5 MG/1
5 TABLET ORAL DAILY
Qty: 90 TABLET | Refills: 3 | Status: SHIPPED | OUTPATIENT
Start: 2024-03-11

## 2024-03-11 RX ORDER — FUROSEMIDE 20 MG/1
20 TABLET ORAL DAILY
Qty: 90 TABLET | Refills: 3 | Status: SHIPPED | OUTPATIENT
Start: 2024-03-11

## 2024-03-14 ENCOUNTER — OFFICE VISIT (OUTPATIENT)
Dept: PAIN MEDICINE | Facility: CLINIC | Age: 74
End: 2024-03-14
Payer: MEDICARE

## 2024-03-14 VITALS
WEIGHT: 162 LBS | OXYGEN SATURATION: 96 % | HEIGHT: 71 IN | HEART RATE: 65 BPM | SYSTOLIC BLOOD PRESSURE: 134 MMHG | BODY MASS INDEX: 22.68 KG/M2 | RESPIRATION RATE: 18 BRPM | DIASTOLIC BLOOD PRESSURE: 71 MMHG | TEMPERATURE: 97.5 F

## 2024-03-14 DIAGNOSIS — M54.16 LUMBAR RADICULOPATHY: Primary | ICD-10-CM

## 2024-03-14 DIAGNOSIS — M48.061 SPINAL STENOSIS, LUMBAR REGION, WITHOUT NEUROGENIC CLAUDICATION: ICD-10-CM

## 2024-03-14 DIAGNOSIS — M96.1 POSTLAMINECTOMY SYNDROME, LUMBAR REGION: ICD-10-CM

## 2024-03-14 DIAGNOSIS — Z79.899 MEDICATION MANAGEMENT: ICD-10-CM

## 2024-03-14 PROCEDURE — 3075F SYST BP GE 130 - 139MM HG: CPT | Performed by: ANESTHESIOLOGY

## 2024-03-14 PROCEDURE — 1125F AMNT PAIN NOTED PAIN PRSNT: CPT | Performed by: ANESTHESIOLOGY

## 2024-03-14 PROCEDURE — 99213 OFFICE O/P EST LOW 20 MIN: CPT | Performed by: ANESTHESIOLOGY

## 2024-03-14 PROCEDURE — 1160F RVW MEDS BY RX/DR IN RCRD: CPT | Performed by: ANESTHESIOLOGY

## 2024-03-14 PROCEDURE — 1159F MED LIST DOCD IN RCRD: CPT | Performed by: ANESTHESIOLOGY

## 2024-03-14 PROCEDURE — 1036F TOBACCO NON-USER: CPT | Performed by: ANESTHESIOLOGY

## 2024-03-14 PROCEDURE — 3078F DIAST BP <80 MM HG: CPT | Performed by: ANESTHESIOLOGY

## 2024-03-14 RX ORDER — OXYCODONE AND ACETAMINOPHEN 5; 325 MG/1; MG/1
1 TABLET ORAL EVERY 8 HOURS PRN
Qty: 90 TABLET | Refills: 0 | Status: SHIPPED | OUTPATIENT
Start: 2024-03-14 | End: 2024-04-13

## 2024-03-14 ASSESSMENT — ENCOUNTER SYMPTOMS
DEPRESSION: 0
PAIN: 1
BACK PAIN: 1
OCCASIONAL FEELINGS OF UNSTEADINESS: 0
LOSS OF SENSATION IN FEET: 0

## 2024-03-14 ASSESSMENT — PAIN SCALES - GENERAL: PAINLEVEL: 8

## 2024-03-14 NOTE — PROGRESS NOTES
Pain #8/10 to lower back and right hip.  Back pain is constant.  Taking 1 1/2-2 Percocet per day which gives him 75% pain relief.  Currently has 5 tablets left.

## 2024-03-14 NOTE — PROGRESS NOTES
Subjective   Patient ID: Gaurav Rehman is a 73 y.o. male who presents for medication management.  Patient's had a previous lumbar laminectomy and has had chronic low back pain with radicular symptoms.  He has advanced facet arthrosis lumbar spine L4-5 L5-S1 level.  OARRS report was reviewed and pain management agreement is up-to-date patient has 5 tablets remaining.  I urged him to continue to take 1-1/2 to maximum 2 tablets a day to moderate his pain.  Pain      Chronic low back pain secondary to previous herniated disc; acquired spondylolisthesis; history of lumbosacral radiculopathy  Review of Systems   Musculoskeletal:  Positive for back pain and gait problem.   All other systems reviewed and are negative.      Objective   Physical Exam  Gen. appearance:  Patient's alert and oriented ×3 ;cooperative mild to moderate distress  Heart: Regular rate and rhythm  Lungs: Clear to auscultation  Abdomen: Soft nontender  Neuro: Cranial nerves II -XII intact; DTR: +2 over 4 biceps triceps brachial radialis patellar and Achilles  Spinal exam: Positive paraspinal tenderness noted bilaterally L4 5 L5-S1 with flexion extension and rotation/no bony abnormalities  Musculoskeletal:  Upper and lower extremity strength was 4/5 bilaterally  :  deferred  Skin: Warm and dry      Assessment/Plan   Diagnoses and all orders for this visit:  Lumbar radiculopathy  Postlaminectomy syndrome, lumbar region  -     oxyCODONE-acetaminophen (Percocet) 5-325 mg tablet; Take 1 tablet by mouth every 8 hours if needed for severe pain (7 - 10).  -     Follow Up In Pain Medicine; Future  Spinal stenosis, lumbar region, without neurogenic claudication  -     oxyCODONE-acetaminophen (Percocet) 5-325 mg tablet; Take 1 tablet by mouth every 8 hours if needed for severe pain (7 - 10).  -     Follow Up In Pain Medicine; Future  Medication management  -     oxyCODONE-acetaminophen (Percocet) 5-325 mg tablet; Take 1 tablet by mouth every 8 hours if needed  for severe pain (7 - 10).  -     Follow Up In Pain Medicine; Future  Patient was e-prescribed oxycodone 5/325 number 90 tablets.  The take between 1 and 3 tablets a day and has a scheduled follow-up visit in 8 weeks on May 9, 2024.       Mikey Taylor DO 03/14/24 11:48 AM

## 2024-04-08 ENCOUNTER — TELEPHONE (OUTPATIENT)
Dept: PAIN MEDICINE | Facility: CLINIC | Age: 74
End: 2024-04-08
Payer: MEDICARE

## 2024-04-17 DIAGNOSIS — I47.19 PAROXYSMAL ATRIAL TACHYCARDIA (CMS-HCC): ICD-10-CM

## 2024-04-23 RX ORDER — SOTALOL HYDROCHLORIDE 80 MG/1
80 TABLET ORAL EVERY 12 HOURS
Qty: 180 TABLET | Refills: 3 | Status: SHIPPED | OUTPATIENT
Start: 2024-04-23

## 2024-05-08 ENCOUNTER — OFFICE VISIT (OUTPATIENT)
Dept: PAIN MEDICINE | Facility: CLINIC | Age: 74
End: 2024-05-08
Payer: MEDICARE

## 2024-05-08 VITALS
SYSTOLIC BLOOD PRESSURE: 135 MMHG | OXYGEN SATURATION: 96 % | HEART RATE: 64 BPM | WEIGHT: 162 LBS | HEIGHT: 71 IN | BODY MASS INDEX: 22.68 KG/M2 | TEMPERATURE: 98.4 F | DIASTOLIC BLOOD PRESSURE: 68 MMHG | RESPIRATION RATE: 18 BRPM

## 2024-05-08 DIAGNOSIS — M54.16 LUMBAR RADICULOPATHY: Primary | ICD-10-CM

## 2024-05-08 DIAGNOSIS — M96.1 POSTLAMINECTOMY SYNDROME, LUMBAR REGION: ICD-10-CM

## 2024-05-08 DIAGNOSIS — Z79.899 MEDICATION MANAGEMENT: ICD-10-CM

## 2024-05-08 DIAGNOSIS — M47.816 FACET DEGENERATION OF LUMBAR REGION: ICD-10-CM

## 2024-05-08 PROCEDURE — 1036F TOBACCO NON-USER: CPT | Performed by: ANESTHESIOLOGY

## 2024-05-08 PROCEDURE — 1125F AMNT PAIN NOTED PAIN PRSNT: CPT | Performed by: ANESTHESIOLOGY

## 2024-05-08 PROCEDURE — 99213 OFFICE O/P EST LOW 20 MIN: CPT | Performed by: ANESTHESIOLOGY

## 2024-05-08 PROCEDURE — 3075F SYST BP GE 130 - 139MM HG: CPT | Performed by: ANESTHESIOLOGY

## 2024-05-08 PROCEDURE — 3078F DIAST BP <80 MM HG: CPT | Performed by: ANESTHESIOLOGY

## 2024-05-08 PROCEDURE — 1159F MED LIST DOCD IN RCRD: CPT | Performed by: ANESTHESIOLOGY

## 2024-05-08 PROCEDURE — 1160F RVW MEDS BY RX/DR IN RCRD: CPT | Performed by: ANESTHESIOLOGY

## 2024-05-08 RX ORDER — OXYCODONE AND ACETAMINOPHEN 5; 325 MG/1; MG/1
1 TABLET ORAL EVERY 8 HOURS PRN
Qty: 90 TABLET | Refills: 0 | Status: SHIPPED | OUTPATIENT
Start: 2024-05-08 | End: 2024-06-07

## 2024-05-08 ASSESSMENT — ENCOUNTER SYMPTOMS
LOSS OF SENSATION IN FEET: 0
OCCASIONAL FEELINGS OF UNSTEADINESS: 0
DEPRESSION: 0

## 2024-05-08 ASSESSMENT — PAIN SCALES - GENERAL: PAINLEVEL: 9

## 2024-05-08 NOTE — PROGRESS NOTES
Pain #9/10 to his lower back, bilateral sciatic pain right worse than left.  This pain all flared up since his Percocet dose was reduced from 1 1/2 tabs from 2 tabs per day from his last visit.  Currently has no tablets left.

## 2024-05-08 NOTE — H&P
History Of Present Illness  Gaurav Rehman is a 74 y.o. male presenting with longstanding history of chronic radicular symptoms secondary to failed laminectomy syndrome.  Patient has been taking 1 and half to 2 oxycodone tablets a day and ran out early since his last visit because of upping it to 2 tablets a day.  Patient states he is unable to walk now because of the half a tablet which he is stated he was short because of increased need of pain medication.  He denies bladder or bowel dysfunction.     Past Medical History  Past Medical History:   Diagnosis Date    Essential (primary) hypertension 12/18/2022    Hypertension, unspecified type       Surgical History  Past Surgical History:   Procedure Laterality Date    BACK SURGERY  11/16/2017    Back Surgery    OTHER SURGICAL HISTORY  11/16/2017    Aortic Root Transloc, Repair VSD & Pulm Stenosis, Coronary Ostium Reimplant    ROTATOR CUFF REPAIR  11/16/2017    Rotator Cuff Repair        Social History  He reports that he has never smoked. He has never used smokeless tobacco. He reports that he does not currently use alcohol. He reports current drug use. Drug: Oxycodone.    Family History  No family history on file.     Allergies  Patient has no known allergies.    Review of Systems  Unremarkable except chief complaint;  Physical Exam  Gen. appearance:  Patient's alert and oriented ×3 ;cooperative mild to moderate distress  Heart: Regular rate and rhythm  Lungs: Clear to auscultation  Abdomen: Soft nontender  Neuro: Cranial nerves II -XII intact; DTR: +2 over 4 biceps triceps brachial radialis patellar and Achilles  Spinal exam: Positive paraspinal tenderness noted bilaterally L4 5 L5-S1 with flexion extension and rotation/no bony abnormalities  Musculoskeletal:  Upper and lower extremity strength was 4/5 bilaterally  :  deferred  Skin: Warm and dry      Last Recorded Vitals  Blood pressure 135/68, pulse 64, temperature 36.9 °C (98.4 °F), resp. rate 18, height 1.803  "m (5' 11\"), weight 73.5 kg (162 lb), SpO2 96%.    Relevant Results         Assessment/Plan   Diagnoses and all orders for this visit:  Lumbar radiculopathy  Postlaminectomy syndrome, lumbar region  -     oxyCODONE-acetaminophen (Percocet) 5-325 mg tablet; Take 1 tablet by mouth every 8 hours if needed for severe pain (7 - 10).  -     Follow Up In Pain Medicine; Future  Facet degeneration of lumbar region  -     oxyCODONE-acetaminophen (Percocet) 5-325 mg tablet; Take 1 tablet by mouth every 8 hours if needed for severe pain (7 - 10).  -     Follow Up In Pain Medicine; Future  Medication management  -     oxyCODONE-acetaminophen (Percocet) 5-325 mg tablet; Take 1 tablet by mouth every 8 hours if needed for severe pain (7 - 10).  -     Follow Up In Pain Medicine; Future    Risk and benefits continued use of oxycodone was discussed with the patient.  He was e-prescribed 90 tablets which is a 7-week supply of approximately 2 tablets a day.  I explained to him that his need to continue on the oxycodone was indicative of developing an addiction to oxycodone versus other medication.  I mentioned that he should consider addiction therapy if his current prescription is not enough to control his pain.  Patient states he is able to bowl 3 times a week at this time.       I spent 24 minutes in the professional and overall care of this patient.      Mikey Taylor, DO    "

## 2024-05-13 ENCOUNTER — HOSPITAL ENCOUNTER (OUTPATIENT)
Dept: CARDIOLOGY | Facility: CLINIC | Age: 74
Discharge: HOME | End: 2024-05-13
Payer: MEDICARE

## 2024-05-13 ENCOUNTER — OFFICE VISIT (OUTPATIENT)
Dept: CARDIOLOGY | Facility: CLINIC | Age: 74
End: 2024-05-13
Payer: MEDICARE

## 2024-05-13 VITALS
SYSTOLIC BLOOD PRESSURE: 112 MMHG | HEIGHT: 71 IN | DIASTOLIC BLOOD PRESSURE: 70 MMHG | BODY MASS INDEX: 22.68 KG/M2 | WEIGHT: 162 LBS | HEART RATE: 66 BPM | OXYGEN SATURATION: 95 %

## 2024-05-13 DIAGNOSIS — I35.1 MILD AORTIC REGURGITATION: ICD-10-CM

## 2024-05-13 DIAGNOSIS — E78.5 HYPERLIPIDEMIA, UNSPECIFIED HYPERLIPIDEMIA TYPE: ICD-10-CM

## 2024-05-13 DIAGNOSIS — I71.21 ANEURYSM OF ASCENDING AORTA WITHOUT RUPTURE (CMS-HCC): ICD-10-CM

## 2024-05-13 DIAGNOSIS — I47.19 PAROXYSMAL ATRIAL TACHYCARDIA (CMS-HCC): Primary | ICD-10-CM

## 2024-05-13 DIAGNOSIS — I10 PRIMARY HYPERTENSION: ICD-10-CM

## 2024-05-13 DIAGNOSIS — I34.0 MODERATE MITRAL REGURGITATION: ICD-10-CM

## 2024-05-13 DIAGNOSIS — I07.1 MODERATE TRICUSPID REGURGITATION: ICD-10-CM

## 2024-05-13 PROCEDURE — 3078F DIAST BP <80 MM HG: CPT | Performed by: INTERNAL MEDICINE

## 2024-05-13 PROCEDURE — 93306 TTE W/DOPPLER COMPLETE: CPT

## 2024-05-13 PROCEDURE — 1036F TOBACCO NON-USER: CPT | Performed by: INTERNAL MEDICINE

## 2024-05-13 PROCEDURE — 3074F SYST BP LT 130 MM HG: CPT | Performed by: INTERNAL MEDICINE

## 2024-05-13 PROCEDURE — 99214 OFFICE O/P EST MOD 30 MIN: CPT | Performed by: INTERNAL MEDICINE

## 2024-05-13 PROCEDURE — 1160F RVW MEDS BY RX/DR IN RCRD: CPT | Performed by: INTERNAL MEDICINE

## 2024-05-13 PROCEDURE — 93306 TTE W/DOPPLER COMPLETE: CPT | Performed by: INTERNAL MEDICINE

## 2024-05-13 PROCEDURE — 1159F MED LIST DOCD IN RCRD: CPT | Performed by: INTERNAL MEDICINE

## 2024-05-13 ASSESSMENT — ENCOUNTER SYMPTOMS
DYSPNEA ON EXERTION: 1
BACK PAIN: 1

## 2024-05-13 NOTE — PATIENT INSTRUCTIONS
Your echocardiogram showed normal heart function with no change in the size of the aorta.     No changes in your medications.

## 2024-05-13 NOTE — PROGRESS NOTES
Subjective   Gaurav Rehman is a 74 y.o. male.    Chief Complaint:  Follow-up valvular heart disease aortic aneurysm.    HPI    Much of his symptoms have been noncardiac.  He has chronic low back pain which is followed by the pain management service.  Has had no anginal symptoms.  Activity levels however are somewhat limited.  Denies palpitations or tachycardia.    Cardiac problems include moderate mitral regurgitation, moderate tricuspid regurgitation, supraventricular tachycardia, ascending aortic aneurysm, mild aortic regurgitation.     The patient has a history of sinus of Valsalva aneurysm. This measures around 4.7 cm. This has remained unchanged. He has a paroxysmal atrial tachycardia which is well controlled on sotalol therapy. He has a history of hypertension and hyperlipidemia.     He has a past history of a paroxysmal atrial tachycardia. He was referred for ablation a few years ago. However this was not successful. He was started on sotalol therapy and has been on sotalol therapy for a few years.     He has a history of complex congenital heart repair in 1995. He had a repair of ventricular septal defect. He had treatment of pulmonic stenosis. He had aortic root surgery with implantation of the coronary arteries.     Allergies  Medication    · No Known Drug Allergies   Recorded By: Laurie Spears; 11/16/2017 11:52:21 AM     Family History  Mother    · Family history of emphysema (V17.6) (Z82.5)   · Family history of throat cancer (V16.0) (Z80.0)  Father    · Family history of throat cancer (V16.0) (Z80.0)  Other    · No family history of cardiac disease     Social History  Problems    · Former smoker (V15.82) (Z87.891)   ·    · No illicit drug use   · Occasional alcohol use       Review of Systems   Constitutional: Positive for malaise/fatigue.   Cardiovascular:  Positive for dyspnea on exertion.   Musculoskeletal:  Positive for arthritis, back pain and joint pain.       Current Outpatient  Medications   Medication Sig Dispense Refill    acetaminophen (Tylenol) 500 mg tablet Take by mouth.      amLODIPine (Norvasc) 5 mg tablet TAKE 1 TABLET EVERY DAY 90 tablet 3    cetirizine (ZyrTEC) 10 mg tablet Take 1 tablet (10 mg) by mouth once daily.      furosemide (Lasix) 20 mg tablet TAKE 1 TABLET EVERY DAY 90 tablet 3    losartan (Cozaar) 100 mg tablet Take 1 tablet (100 mg) by mouth once daily.      naloxone (Narcan) 4 mg/0.1 mL nasal spray Administer into affected nostril(s).      oxyCODONE-acetaminophen (Percocet) 5-325 mg tablet Take 1 tablet by mouth every 8 hours if needed for severe pain (7 - 10). 90 tablet 0    rosuvastatin (Crestor) 40 mg tablet       sildenafil (Viagra) 100 mg tablet Take by mouth.      sotalol (Betapace) 80 mg tablet TAKE 1 TABLET EVERY 12 HOURS 180 tablet 3     No current facility-administered medications for this visit.        Visit Vitals  Smoking Status Never        Objective     Constitutional:       Appearance: Not in distress.   Neck:      Vascular: JVD normal.   Pulmonary:      Breath sounds: Normal breath sounds.   Cardiovascular:      Normal rate. Regular rhythm. S1 with normal intensity. S2 with normal intensity.       Murmurs: There is a grade 1/6 systolic murmur.      No gallop.    Pulses:     Intact distal pulses.   Edema:     Peripheral edema absent.   Abdominal:      General: Bowel sounds are normal.   Neurological:      Mental Status: Alert and oriented to person, place and time.         Lab Review:   Lab Results   Component Value Date     03/21/2023    K 3.8 03/21/2023     03/21/2023    CO2 24 03/21/2023    BUN 20 03/21/2023    CREATININE 0.81 03/21/2023    GLUCOSE 92 03/21/2023    CALCIUM 9.6 03/21/2023     Lab Results   Component Value Date    CHOL 152 03/21/2023    TRIG 85 03/21/2023    HDL 55.5 03/21/2023       Assessment:    1.  Sinus of Valsalva aneurysm.  Measures 4.5 by echo.  Ascending aorta is about 4.2 cm.  No progression of the aortic  dilatation.  Continue observation.    2.  Coronary artery disease.  We personally reviewed his CT of the chest done in 2023.  This shows mild atherosclerosis in the distribution of the coronary arteries.    3.  Atrial arrhythmias.  Paroxysmal atrial tachycardia.  On sotalol therapy.  No breakthrough arrhythmias.  If he has a breakthrough arrhythmia we will do Holter monitoring and refer to electrophysiology service.    4.  Hyperlipidemia.  Cholesterol 152, HDL 55, LDL 80.    5.  Hypertension.  Blood pressures are well-controlled.  Potassium 3.8, BUN 20, creatinine 0.8.    6.  Moderate mitral regurgitation.  No left heart failure noted on today's exam.    7.  Moderate tricuspid regurgitation.  No right heart failure present.

## 2024-05-14 LAB
AORTIC VALVE MEAN GRADIENT: 4.6 MMHG
AORTIC VALVE PEAK VELOCITY: 1.47 M/S
AV PEAK GRADIENT: 8.7 MMHG
AVA (PEAK VEL): 5.66 CM2
AVA (VTI): 5.59 CM2
EJECTION FRACTION APICAL 4 CHAMBER: 62.3
LEFT ATRIUM VOLUME AREA LENGTH INDEX BSA: 49 ML/M2
LEFT VENTRICLE INTERNAL DIMENSION DIASTOLE: 4.65 CM (ref 3.5–6)
LEFT VENTRICULAR OUTFLOW TRACT DIAMETER: 3.09 CM
LV EJECTION FRACTION BIPLANE: 59 %
MITRAL VALVE E/A RATIO: 0.77
MITRAL VALVE E/E' RATIO: 8.75
RIGHT VENTRICLE FREE WALL PEAK S': 10 CM/S
RIGHT VENTRICLE PEAK SYSTOLIC PRESSURE: 31.1 MMHG
TRICUSPID ANNULAR PLANE SYSTOLIC EXCURSION: 1.7 CM

## 2024-05-15 ENCOUNTER — LAB (OUTPATIENT)
Dept: LAB | Facility: LAB | Age: 74
End: 2024-05-15
Payer: MEDICARE

## 2024-05-15 DIAGNOSIS — E78.5 HYPERLIPIDEMIA, UNSPECIFIED HYPERLIPIDEMIA TYPE: ICD-10-CM

## 2024-05-15 DIAGNOSIS — I10 PRIMARY HYPERTENSION: ICD-10-CM

## 2024-05-15 LAB
ALBUMIN SERPL BCP-MCNC: 4.4 G/DL (ref 3.4–5)
ALP SERPL-CCNC: 47 U/L (ref 33–136)
ALT SERPL W P-5'-P-CCNC: 16 U/L (ref 10–52)
ANION GAP SERPL CALC-SCNC: 12 MMOL/L (ref 10–20)
AST SERPL W P-5'-P-CCNC: 18 U/L (ref 9–39)
BILIRUB SERPL-MCNC: 1 MG/DL (ref 0–1.2)
BUN SERPL-MCNC: 43 MG/DL (ref 6–23)
CALCIUM SERPL-MCNC: 9.3 MG/DL (ref 8.6–10.6)
CHLORIDE SERPL-SCNC: 108 MMOL/L (ref 98–107)
CHOLEST SERPL-MCNC: 102 MG/DL (ref 0–199)
CHOLESTEROL/HDL RATIO: 2.4
CO2 SERPL-SCNC: 25 MMOL/L (ref 21–32)
CREAT SERPL-MCNC: 1.32 MG/DL (ref 0.5–1.3)
EGFRCR SERPLBLD CKD-EPI 2021: 56 ML/MIN/1.73M*2
GLUCOSE SERPL-MCNC: 101 MG/DL (ref 74–99)
HDLC SERPL-MCNC: 42.8 MG/DL
LDLC SERPL CALC-MCNC: 35 MG/DL
NON HDL CHOLESTEROL: 59 MG/DL (ref 0–149)
POTASSIUM SERPL-SCNC: 4.3 MMOL/L (ref 3.5–5.3)
PROT SERPL-MCNC: 7.3 G/DL (ref 6.4–8.2)
SODIUM SERPL-SCNC: 141 MMOL/L (ref 136–145)
TRIGL SERPL-MCNC: 120 MG/DL (ref 0–149)
VLDL: 24 MG/DL (ref 0–40)

## 2024-05-15 PROCEDURE — 36415 COLL VENOUS BLD VENIPUNCTURE: CPT

## 2024-05-15 PROCEDURE — 80053 COMPREHEN METABOLIC PANEL: CPT

## 2024-05-15 PROCEDURE — 80061 LIPID PANEL: CPT

## 2024-05-29 DIAGNOSIS — E78.5 HYPERLIPIDEMIA, UNSPECIFIED HYPERLIPIDEMIA TYPE: ICD-10-CM

## 2024-05-29 RX ORDER — ROSUVASTATIN CALCIUM 40 MG/1
40 TABLET, COATED ORAL DAILY
Qty: 90 TABLET | Refills: 3 | Status: SHIPPED | OUTPATIENT
Start: 2024-05-29 | End: 2024-05-30 | Stop reason: SDUPTHER

## 2024-05-30 DIAGNOSIS — E78.5 HYPERLIPIDEMIA, UNSPECIFIED HYPERLIPIDEMIA TYPE: Primary | ICD-10-CM

## 2024-05-30 RX ORDER — ROSUVASTATIN CALCIUM 40 MG/1
40 TABLET, COATED ORAL DAILY
Qty: 90 TABLET | Refills: 3 | Status: SHIPPED | OUTPATIENT
Start: 2024-05-30 | End: 2025-05-30

## 2024-06-26 ENCOUNTER — OFFICE VISIT (OUTPATIENT)
Dept: PAIN MEDICINE | Facility: CLINIC | Age: 74
End: 2024-06-26
Payer: MEDICARE

## 2024-06-26 VITALS
HEART RATE: 68 BPM | OXYGEN SATURATION: 96 % | BODY MASS INDEX: 22.68 KG/M2 | RESPIRATION RATE: 18 BRPM | SYSTOLIC BLOOD PRESSURE: 147 MMHG | HEIGHT: 71 IN | TEMPERATURE: 98.2 F | DIASTOLIC BLOOD PRESSURE: 79 MMHG | WEIGHT: 162 LBS

## 2024-06-26 DIAGNOSIS — M47.816 FACET DEGENERATION OF LUMBAR REGION: ICD-10-CM

## 2024-06-26 DIAGNOSIS — Z79.899 MEDICATION MANAGEMENT: ICD-10-CM

## 2024-06-26 DIAGNOSIS — M96.1 POSTLAMINECTOMY SYNDROME, LUMBAR REGION: ICD-10-CM

## 2024-06-26 DIAGNOSIS — M54.16 LUMBAR RADICULOPATHY: Primary | ICD-10-CM

## 2024-06-26 PROCEDURE — 1159F MED LIST DOCD IN RCRD: CPT | Performed by: ANESTHESIOLOGY

## 2024-06-26 PROCEDURE — 1160F RVW MEDS BY RX/DR IN RCRD: CPT | Performed by: ANESTHESIOLOGY

## 2024-06-26 PROCEDURE — 3078F DIAST BP <80 MM HG: CPT | Performed by: ANESTHESIOLOGY

## 2024-06-26 PROCEDURE — 1125F AMNT PAIN NOTED PAIN PRSNT: CPT | Performed by: ANESTHESIOLOGY

## 2024-06-26 PROCEDURE — 1036F TOBACCO NON-USER: CPT | Performed by: ANESTHESIOLOGY

## 2024-06-26 PROCEDURE — 99213 OFFICE O/P EST LOW 20 MIN: CPT | Performed by: ANESTHESIOLOGY

## 2024-06-26 PROCEDURE — 3077F SYST BP >= 140 MM HG: CPT | Performed by: ANESTHESIOLOGY

## 2024-06-26 RX ORDER — OXYCODONE AND ACETAMINOPHEN 5; 325 MG/1; MG/1
1 TABLET ORAL EVERY 12 HOURS PRN
Qty: 90 TABLET | Refills: 0 | Status: SHIPPED | OUTPATIENT
Start: 2024-06-26 | End: 2024-06-26 | Stop reason: SDUPTHER

## 2024-06-26 RX ORDER — OXYCODONE AND ACETAMINOPHEN 5; 325 MG/1; MG/1
1 TABLET ORAL EVERY 8 HOURS PRN
Qty: 90 TABLET | Refills: 0 | Status: SHIPPED | OUTPATIENT
Start: 2024-06-26 | End: 2024-07-26

## 2024-06-26 ASSESSMENT — PAIN SCALES - GENERAL: PAINLEVEL: 9

## 2024-06-26 ASSESSMENT — ENCOUNTER SYMPTOMS
LOSS OF SENSATION IN FEET: 0
OCCASIONAL FEELINGS OF UNSTEADINESS: 0
DEPRESSION: 0

## 2024-06-26 ASSESSMENT — COLUMBIA-SUICIDE SEVERITY RATING SCALE - C-SSRS
2. HAVE YOU ACTUALLY HAD ANY THOUGHTS OF KILLING YOURSELF?: NO
6. HAVE YOU EVER DONE ANYTHING, STARTED TO DO ANYTHING, OR PREPARED TO DO ANYTHING TO END YOUR LIFE?: NO
1. IN THE PAST MONTH, HAVE YOU WISHED YOU WERE DEAD OR WISHED YOU COULD GO TO SLEEP AND NOT WAKE UP?: NO

## 2024-06-26 NOTE — PROGRESS NOTES
Pain #9/10 to his lower back which is constant.  Taking 2 Percocet per day and has 0 tablets left.  States they reduce his pain  by at least 50%.

## 2024-06-26 NOTE — PROGRESS NOTES
Subjective   Patient ID: Gaurav Rehman is a 74 y.o. male who presents for chronic low back chronic low back Pain.  HPI  Patient has a previous history of lumbar laminectomy  Review of Systems  Chronic opioid use for pain control; postlaminectomy syndrome  Objective   Physical Exam  Gen. appearance:  Patient's alert and oriented ×3 ;cooperative mild to moderate distress  Heart: Regular rate and rhythm  Lungs: Clear to auscultation  Abdomen: Soft nontender  Neuro: Cranial nerves II -XII intact; DTR: +2 over 4 biceps triceps brachial radialis patellar and Achilles  Spinal exam: Positive paraspinal tenderness noted bilaterally L4 5 L5-S1 with flexion extension and rotation/no bony abnormalities  Musculoskeletal:  Upper and lower extremity strength was 4/5 bilaterally  :  deferred  Skin: Warm and dry      Assessment/Plan   Diagnoses and all orders for this visit:  Lumbar radiculopathy  -     oxyCODONE-acetaminophen (Percocet) 5-325 mg tablet; Take 1 tablet by mouth every 12 hours if needed for severe pain (7 - 10).  -     Follow Up In Pain Medicine; Future  Postlaminectomy syndrome, lumbar region  -     Follow Up In Pain Medicine  -     oxyCODONE-acetaminophen (Percocet) 5-325 mg tablet; Take 1 tablet by mouth every 12 hours if needed for severe pain (7 - 10).  -     Follow Up In Pain Medicine; Future  Facet degeneration of lumbar region  -     Follow Up In Pain Medicine  Medication management  -     Follow Up In Pain Medicine  -     oxyCODONE-acetaminophen (Percocet) 5-325 mg tablet; Take 1 tablet by mouth every 12 hours if needed for severe pain (7 - 10).  -     Follow Up In Pain Medicine; Future         Mikey Taylor DO 06/26/24 2:45 PM

## 2024-08-07 ENCOUNTER — APPOINTMENT (OUTPATIENT)
Dept: PAIN MEDICINE | Facility: CLINIC | Age: 74
End: 2024-08-07
Payer: MEDICARE

## 2024-08-07 RX ORDER — OXYCODONE AND ACETAMINOPHEN 5; 325 MG/1; MG/1
1 TABLET ORAL EVERY 12 HOURS PRN
Qty: 30 TABLET | Refills: 0 | Status: SHIPPED | OUTPATIENT
Start: 2024-08-07 | End: 2024-08-21 | Stop reason: SDUPTHER

## 2024-08-21 ENCOUNTER — OFFICE VISIT (OUTPATIENT)
Dept: PAIN MEDICINE | Facility: CLINIC | Age: 74
End: 2024-08-21
Payer: MEDICARE

## 2024-08-21 VITALS
TEMPERATURE: 98.2 F | WEIGHT: 162 LBS | OXYGEN SATURATION: 97 % | RESPIRATION RATE: 18 BRPM | SYSTOLIC BLOOD PRESSURE: 129 MMHG | HEIGHT: 71 IN | DIASTOLIC BLOOD PRESSURE: 64 MMHG | BODY MASS INDEX: 22.68 KG/M2 | HEART RATE: 59 BPM

## 2024-08-21 DIAGNOSIS — M54.16 LUMBAR RADICULOPATHY: ICD-10-CM

## 2024-08-21 DIAGNOSIS — M96.1 POSTLAMINECTOMY SYNDROME, LUMBAR REGION: ICD-10-CM

## 2024-08-21 DIAGNOSIS — Z79.899 MEDICATION MANAGEMENT: ICD-10-CM

## 2024-08-21 DIAGNOSIS — Z79.891 LONG TERM (CURRENT) USE OF OPIATE ANALGESIC: Primary | ICD-10-CM

## 2024-08-21 LAB
AMPHETAMINES UR QL SCN: NORMAL
BARBITURATES UR QL SCN: NORMAL
BZE UR QL SCN: NORMAL
CANNABINOIDS UR QL SCN: NORMAL
CREAT UR-MCNC: 171.9 MG/DL (ref 20–370)
PCP UR QL SCN: NORMAL

## 2024-08-21 PROCEDURE — 1036F TOBACCO NON-USER: CPT | Performed by: ANESTHESIOLOGY

## 2024-08-21 PROCEDURE — 99212 OFFICE O/P EST SF 10 MIN: CPT | Performed by: ANESTHESIOLOGY

## 2024-08-21 PROCEDURE — 1125F AMNT PAIN NOTED PAIN PRSNT: CPT | Performed by: ANESTHESIOLOGY

## 2024-08-21 PROCEDURE — 82570 ASSAY OF URINE CREATININE: CPT | Mod: 59 | Performed by: ANESTHESIOLOGY

## 2024-08-21 PROCEDURE — 1159F MED LIST DOCD IN RCRD: CPT | Performed by: ANESTHESIOLOGY

## 2024-08-21 PROCEDURE — 3078F DIAST BP <80 MM HG: CPT | Performed by: ANESTHESIOLOGY

## 2024-08-21 PROCEDURE — 3074F SYST BP LT 130 MM HG: CPT | Performed by: ANESTHESIOLOGY

## 2024-08-21 PROCEDURE — 1160F RVW MEDS BY RX/DR IN RCRD: CPT | Performed by: ANESTHESIOLOGY

## 2024-08-21 PROCEDURE — 80346 BENZODIAZEPINES1-12: CPT | Performed by: ANESTHESIOLOGY

## 2024-08-21 RX ORDER — OXYCODONE AND ACETAMINOPHEN 5; 325 MG/1; MG/1
1 TABLET ORAL EVERY 12 HOURS PRN
Qty: 90 TABLET | Refills: 0 | Status: SHIPPED | OUTPATIENT
Start: 2024-08-21 | End: 2024-09-20

## 2024-08-21 ASSESSMENT — PAIN SCALES - GENERAL
PAINLEVEL_OUTOF10: 9
PAINLEVEL: 9

## 2024-08-21 ASSESSMENT — PAIN DESCRIPTION - DESCRIPTORS: DESCRIPTORS: BURNING;ACHING;SHARP

## 2024-08-21 ASSESSMENT — PAIN - FUNCTIONAL ASSESSMENT: PAIN_FUNCTIONAL_ASSESSMENT: 0-10

## 2024-08-21 ASSESSMENT — ENCOUNTER SYMPTOMS
OCCASIONAL FEELINGS OF UNSTEADINESS: 1
DEPRESSION: 0
LOSS OF SENSATION IN FEET: 0

## 2024-08-21 NOTE — PROGRESS NOTES
Pt is complaining of pain in his low back that radiates down his right leg. Pt states his pain level is an 9/10 on the pain level. Pt has 2 percocets left.

## 2024-08-21 NOTE — PROGRESS NOTES
Subjective   Patient ID: Gaurav Rehman is a 74 y.o. male who presents for Back Pain.  Patient has a history of previous lumbar laminectomy as well as radiofrequency ablation of lumbar medial branch nerves.  He takes 1-1/2 to 2 tablets of Percocet a day to moderate his pain.  His pill count today is 0.  OARRS report was reviewed and is morphine equivalent score score is 9.8.  HPI  Chronic low back pain secondary to previous lumbar laminectomy; lumbosacral radiculopathy; medication management  Review of Systems  Chronic low back pain with medication management.  He denies bladder or bowel dysfunction  Objective   Physical Exam  Gen. appearance:  Patient's alert and oriented ×3 ;cooperative mild to moderate distress  Heart: Regular rate and rhythm  Lungs: Clear to auscultation  Abdomen: Soft nontender  Neuro: Cranial nerves II -XII intact; DTR: +2 over 4 biceps triceps brachial radialis patellar and Achilles  Spinal exam: Positive paraspinal tenderness noted bilaterally L4 5 L5-S1 with flexion extension and rotation/no bony abnormalities  Musculoskeletal:  Upper and lower extremity strength was 4/5 bilaterally  :  deferred  Skin: Warm and dry      Assessment/Plan   Diagnoses and all orders for this visit:  Long term (current) use of opiate analgesic  -     Opiate/Opioid/Benzo Prescription Compliance  -     OOB Internal Tracking  -     Follow Up In Pain Medicine; Future  Lumbar radiculopathy  -     Follow Up In Pain Medicine  -     oxyCODONE-acetaminophen (Percocet) 5-325 mg tablet; Take 1 tablet by mouth every 12 hours if needed for severe pain (7 - 10).  Postlaminectomy syndrome, lumbar region  -     Follow Up In Pain Medicine  -     Follow Up In Pain Medicine; Future  Medication management  -     Follow Up In Pain Medicine  -     Follow Up In Pain Medicine; Future  Risk and benefits of radiofrequency ablation lumbar medial branch nerves was again discussed with the patient at length.  He is scheduled for bilateral  radiofrequency ablation lumbar medial branch nerves L4-5, L5-S1 with fluoroscopy on October 9, 2024.  Patient was specifically told to be n.p.o. for 6 hours prior to the scheduled procedure.  He was also told to discontinue taking any aspirin or anticoagulation products prior to his procedure.       Mikey Taylor,  08/21/24 4:51 PM

## 2024-08-22 ENCOUNTER — TELEPHONE (OUTPATIENT)
Dept: PAIN MEDICINE | Facility: CLINIC | Age: 74
End: 2024-08-22
Payer: MEDICARE

## 2024-08-22 NOTE — TELEPHONE ENCOUNTER
[facility] CALLED STATING THE QUANTITY DOESN'T MATCH THE NEW DIRECTIONS THAT WAS SENT OVER FOR THE PERCOCET

## 2024-08-28 LAB
1OH-MIDAZOLAM UR CFM-MCNC: <25 NG/ML
6MAM UR CFM-MCNC: <25 NG/ML
7AMINOCLONAZEPAM UR CFM-MCNC: <25 NG/ML
A-OH ALPRAZ UR CFM-MCNC: <25 NG/ML
ALPRAZ UR CFM-MCNC: <25 NG/ML
CHLORDIAZEP UR CFM-MCNC: <25 NG/ML
CLONAZEPAM UR CFM-MCNC: <25 NG/ML
CODEINE UR CFM-MCNC: <50 NG/ML
DIAZEPAM UR CFM-MCNC: <25 NG/ML
EDDP UR CFM-MCNC: <25 NG/ML
FENTANYL UR CFM-MCNC: <2.5 NG/ML
HYDROCODONE CTO UR CFM-MCNC: <25 NG/ML
HYDROMORPHONE UR CFM-MCNC: <25 NG/ML
LORAZEPAM UR CFM-MCNC: <25 NG/ML
METHADONE UR CFM-MCNC: <25 NG/ML
MIDAZOLAM UR CFM-MCNC: <25 NG/ML
MORPHINE UR CFM-MCNC: <50 NG/ML
NORDIAZEPAM UR CFM-MCNC: <25 NG/ML
NORFENTANYL UR CFM-MCNC: <2.5 NG/ML
NORHYDROCODONE UR CFM-MCNC: <25 NG/ML
NOROXYCODONE UR CFM-MCNC: 974 NG/ML
NORTRAMADOL UR-MCNC: <50 NG/ML
OXAZEPAM UR CFM-MCNC: <25 NG/ML
OXYCODONE UR CFM-MCNC: 757 NG/ML
OXYMORPHONE UR CFM-MCNC: 1082 NG/ML
TEMAZEPAM UR CFM-MCNC: <25 NG/ML
TRAMADOL UR CFM-MCNC: <50 NG/ML
ZOLPIDEM UR CFM-MCNC: <25 NG/ML
ZOLPIDEM UR-MCNC: <25 NG/ML

## 2024-09-03 ENCOUNTER — TELEPHONE (OUTPATIENT)
Dept: PAIN MEDICINE | Facility: CLINIC | Age: 74
End: 2024-09-03
Payer: MEDICARE

## 2024-09-06 DIAGNOSIS — I10 PRIMARY HYPERTENSION: ICD-10-CM

## 2024-09-08 RX ORDER — LOSARTAN POTASSIUM 100 MG/1
100 TABLET ORAL DAILY
Qty: 90 TABLET | Refills: 3 | Status: SHIPPED | OUTPATIENT
Start: 2024-09-08 | End: 2025-09-08

## 2024-09-11 DIAGNOSIS — M47.816 FACET DEGENERATION OF LUMBAR REGION: Primary | ICD-10-CM

## 2024-09-26 DIAGNOSIS — M96.1 POSTLAMINECTOMY SYNDROME, LUMBAR REGION: ICD-10-CM

## 2024-09-26 DIAGNOSIS — M47.816 FACET DEGENERATION OF LUMBAR REGION: Primary | ICD-10-CM

## 2024-09-26 NOTE — PROGRESS NOTES
Radiofrequency ablation scheduled in place of diagnostic medial branch nerve block for October 9, 2024

## 2024-10-03 ENCOUNTER — TELEPHONE (OUTPATIENT)
Dept: PAIN MEDICINE | Facility: CLINIC | Age: 74
End: 2024-10-03
Payer: MEDICARE

## 2024-10-09 ENCOUNTER — APPOINTMENT (OUTPATIENT)
Dept: PAIN MEDICINE | Facility: CLINIC | Age: 74
End: 2024-10-09
Payer: MEDICARE

## 2024-10-09 ENCOUNTER — OFFICE VISIT (OUTPATIENT)
Dept: PAIN MEDICINE | Facility: CLINIC | Age: 74
End: 2024-10-09
Payer: MEDICARE

## 2024-10-09 VITALS
HEART RATE: 67 BPM | WEIGHT: 162 LBS | BODY MASS INDEX: 22.68 KG/M2 | HEIGHT: 71 IN | RESPIRATION RATE: 18 BRPM | TEMPERATURE: 97.7 F | OXYGEN SATURATION: 97 % | SYSTOLIC BLOOD PRESSURE: 116 MMHG | DIASTOLIC BLOOD PRESSURE: 70 MMHG

## 2024-10-09 DIAGNOSIS — M47.812 FACET ARTHRITIS OF CERVICAL REGION: ICD-10-CM

## 2024-10-09 DIAGNOSIS — M47.816 FACET DEGENERATION OF LUMBAR REGION: ICD-10-CM

## 2024-10-09 DIAGNOSIS — M54.16 LUMBAR RADICULOPATHY: ICD-10-CM

## 2024-10-09 DIAGNOSIS — M48.061 SPINAL STENOSIS, LUMBAR REGION, WITHOUT NEUROGENIC CLAUDICATION: Primary | ICD-10-CM

## 2024-10-09 DIAGNOSIS — I47.19 PAROXYSMAL ATRIAL TACHYCARDIA (CMS-HCC): ICD-10-CM

## 2024-10-09 PROCEDURE — 1036F TOBACCO NON-USER: CPT | Performed by: ANESTHESIOLOGY

## 2024-10-09 PROCEDURE — 99213 OFFICE O/P EST LOW 20 MIN: CPT | Performed by: ANESTHESIOLOGY

## 2024-10-09 PROCEDURE — 1125F AMNT PAIN NOTED PAIN PRSNT: CPT | Performed by: ANESTHESIOLOGY

## 2024-10-09 PROCEDURE — 3078F DIAST BP <80 MM HG: CPT | Performed by: ANESTHESIOLOGY

## 2024-10-09 PROCEDURE — 1159F MED LIST DOCD IN RCRD: CPT | Performed by: ANESTHESIOLOGY

## 2024-10-09 PROCEDURE — 3074F SYST BP LT 130 MM HG: CPT | Performed by: ANESTHESIOLOGY

## 2024-10-09 PROCEDURE — 1160F RVW MEDS BY RX/DR IN RCRD: CPT | Performed by: ANESTHESIOLOGY

## 2024-10-09 RX ORDER — OXYCODONE AND ACETAMINOPHEN 5; 325 MG/1; MG/1
1 TABLET ORAL EVERY 8 HOURS PRN
Qty: 90 TABLET | Refills: 0 | Status: SHIPPED | OUTPATIENT
Start: 2024-10-09 | End: 2024-11-08

## 2024-10-09 ASSESSMENT — ENCOUNTER SYMPTOMS
OCCASIONAL FEELINGS OF UNSTEADINESS: 0
LOSS OF SENSATION IN FEET: 0
DEPRESSION: 0

## 2024-10-09 ASSESSMENT — PAIN SCALES - GENERAL: PAINLEVEL: 9

## 2024-10-09 NOTE — PROGRESS NOTES
Subjective   Patient ID: Gaurav Rehman is a 74 y.o. male who presents for chronic low back Pain.  Patient has had a previous lumbar laminectomy and has documented lumbar spondylosis causing significant chronic low back pain.  He moderates his pain by taking oxycodone 5 mg tablets with 2-3 a day depending on his level of pain and activity.  OARRS report shows his active cumulative morphine equivalent of 8.3  HPI  Prior lumbar laminectomy; lumbar spondylosis without myelopathy; history of PAT; chronic low back pain secondary to lumbar spondylosis  Review of Systems  Patient denies bladder or bowel dysfunction.  His pain is unchanged from being across his lumbar spine into his buttocks.  He is scheduled next week for diagnostic medial branch nerve block with direct fluoroscopy at L4-5 L5-S1 level.  Objective   Physical Exam  Gen. appearance:  Patient's alert and oriented ×3 ;cooperative mild to moderate distress  Heart: Regular rate and rhythm  Lungs: Clear to auscultation  Abdomen: Soft nontender  Neuro: Cranial nerves II -XII intact; DTR: +2 over 4 biceps triceps brachial radialis patellar and Achilles  Spinal exam: Positive paraspinal tenderness noted bilaterally L4 5 L5-S1 with flexion extension and rotation/no bony abnormalities  Musculoskeletal:  Upper and lower extremity strength was 4/5 bilaterally  :  deferred  Skin: Warm and dry      Assessment/Plan   Diagnoses and all orders for this visit:  Spinal stenosis, lumbar region, without neurogenic claudication  -     oxyCODONE-acetaminophen (Percocet) 5-325 mg tablet; Take 1 tablet by mouth every 8 hours if needed for severe pain (7 - 10).  -     Follow Up In Pain Medicine; Future  Facet degeneration of lumbar region  -     oxyCODONE-acetaminophen (Percocet) 5-325 mg tablet; Take 1 tablet by mouth every 8 hours if needed for severe pain (7 - 10).  -     Follow Up In Pain Medicine; Future  Paroxysmal atrial tachycardia (CMS-HCC)  Lumbar radiculopathy  Facet  arthritis of cervical region     Follow-up follow-up visit next week for diagnostic medial branch nerve block was discussed with him and he was told to be n.p.o. for 6 hours prior to scheduled procedure.  He was also told to take all medication as directed and keep a secure location at all times.    Mikey Taylor,  10/09/24 3:35 PM

## 2024-10-09 NOTE — PROGRESS NOTES
Pain #9/10 to his lower back and right sciatic.   Pain is constant.    Takes 2 Percocet per day and has 0 tablets left.  These help reduce his pain by 50%.

## 2024-10-14 DIAGNOSIS — M96.1 POSTLAMINECTOMY SYNDROME, LUMBAR REGION: Primary | ICD-10-CM

## 2024-10-14 DIAGNOSIS — M54.16 LUMBAR RADICULOPATHY: ICD-10-CM

## 2024-10-14 NOTE — PROGRESS NOTES
Seizure change from radiofrequency ablation lumbar medial branch nerves II a caudal epidural steroid injection under fluoroscopy on Wednesday, 10/16/2024.

## 2024-10-16 ENCOUNTER — APPOINTMENT (OUTPATIENT)
Dept: PAIN MEDICINE | Facility: CLINIC | Age: 74
End: 2024-10-16
Payer: MEDICARE

## 2024-10-16 ENCOUNTER — HOSPITAL ENCOUNTER (OUTPATIENT)
Dept: PAIN MEDICINE | Facility: CLINIC | Age: 74
Discharge: HOME | End: 2024-10-16
Payer: MEDICARE

## 2024-10-16 VITALS
RESPIRATION RATE: 18 BRPM | HEART RATE: 72 BPM | TEMPERATURE: 97.9 F | SYSTOLIC BLOOD PRESSURE: 159 MMHG | BODY MASS INDEX: 22.68 KG/M2 | OXYGEN SATURATION: 98 % | DIASTOLIC BLOOD PRESSURE: 73 MMHG | WEIGHT: 162 LBS | HEIGHT: 71 IN

## 2024-10-16 DIAGNOSIS — M47.812 FACET ARTHRITIS OF CERVICAL REGION: ICD-10-CM

## 2024-10-16 DIAGNOSIS — M54.16 LUMBAR RADICULOPATHY: ICD-10-CM

## 2024-10-16 DIAGNOSIS — M48.061 SPINAL STENOSIS, LUMBAR REGION, WITHOUT NEUROGENIC CLAUDICATION: Primary | ICD-10-CM

## 2024-10-16 DIAGNOSIS — M96.1 POSTLAMINECTOMY SYNDROME, LUMBAR REGION: ICD-10-CM

## 2024-10-16 PROCEDURE — 62323 NJX INTERLAMINAR LMBR/SAC: CPT | Performed by: ANESTHESIOLOGY

## 2024-10-16 PROCEDURE — 2500000004 HC RX 250 GENERAL PHARMACY W/ HCPCS (ALT 636 FOR OP/ED): Performed by: ANESTHESIOLOGY

## 2024-10-16 PROCEDURE — 2550000001 HC RX 255 CONTRASTS: Performed by: ANESTHESIOLOGY

## 2024-10-16 RX ORDER — METHYLPREDNISOLONE ACETATE 40 MG/ML
INJECTION, SUSPENSION INTRA-ARTICULAR; INTRALESIONAL; INTRAMUSCULAR; SOFT TISSUE AS NEEDED
Status: COMPLETED | OUTPATIENT
Start: 2024-10-16 | End: 2024-10-16

## 2024-10-16 RX ORDER — LIDOCAINE HYDROCHLORIDE 5 MG/ML
INJECTION, SOLUTION INFILTRATION; INTRAVENOUS AS NEEDED
Status: COMPLETED | OUTPATIENT
Start: 2024-10-16 | End: 2024-10-16

## 2024-10-16 ASSESSMENT — ENCOUNTER SYMPTOMS
LOSS OF SENSATION IN FEET: 0
DEPRESSION: 0
OCCASIONAL FEELINGS OF UNSTEADINESS: 0

## 2024-10-16 ASSESSMENT — PAIN DESCRIPTION - DESCRIPTORS: DESCRIPTORS: ACHING

## 2024-10-16 ASSESSMENT — PAIN SCALES - GENERAL
PAINLEVEL_OUTOF10: 8
PAINLEVEL_OUTOF10: 7

## 2024-10-16 ASSESSMENT — PAIN - FUNCTIONAL ASSESSMENT
PAIN_FUNCTIONAL_ASSESSMENT: 0-10
PAIN_FUNCTIONAL_ASSESSMENT: 0-10

## 2024-10-16 NOTE — Clinical Note
Patient tolerated the procedure well and is comfortable with no complaints of pain. Vital signs stable. Arousable prior to transport. Patient transported to PACU via stretcher/wheelchair. Handoff completed.  right normal/left normal

## 2024-10-29 PROBLEM — Z79.899 MEDICATION MANAGEMENT: Status: RESOLVED | Noted: 2024-03-14 | Resolved: 2024-10-29

## 2024-11-13 ENCOUNTER — OFFICE VISIT (OUTPATIENT)
Dept: PAIN MEDICINE | Facility: CLINIC | Age: 74
End: 2024-11-13
Payer: MEDICARE

## 2024-11-13 VITALS
HEIGHT: 71 IN | TEMPERATURE: 97.7 F | DIASTOLIC BLOOD PRESSURE: 89 MMHG | WEIGHT: 163 LBS | BODY MASS INDEX: 22.82 KG/M2 | SYSTOLIC BLOOD PRESSURE: 148 MMHG | HEART RATE: 61 BPM | RESPIRATION RATE: 16 BRPM

## 2024-11-13 DIAGNOSIS — M48.062 LUMBAR STENOSIS WITH NEUROGENIC CLAUDICATION: ICD-10-CM

## 2024-11-13 DIAGNOSIS — M54.16 LUMBAR RADICULOPATHY, CHRONIC: Primary | ICD-10-CM

## 2024-11-13 PROCEDURE — 1125F AMNT PAIN NOTED PAIN PRSNT: CPT | Performed by: ANESTHESIOLOGY

## 2024-11-13 PROCEDURE — 3079F DIAST BP 80-89 MM HG: CPT | Performed by: ANESTHESIOLOGY

## 2024-11-13 PROCEDURE — 3077F SYST BP >= 140 MM HG: CPT | Performed by: ANESTHESIOLOGY

## 2024-11-13 PROCEDURE — 99213 OFFICE O/P EST LOW 20 MIN: CPT | Performed by: ANESTHESIOLOGY

## 2024-11-13 PROCEDURE — 1036F TOBACCO NON-USER: CPT | Performed by: ANESTHESIOLOGY

## 2024-11-13 PROCEDURE — 1159F MED LIST DOCD IN RCRD: CPT | Performed by: ANESTHESIOLOGY

## 2024-11-13 ASSESSMENT — PAIN - FUNCTIONAL ASSESSMENT: PAIN_FUNCTIONAL_ASSESSMENT: 0-10

## 2024-11-13 ASSESSMENT — PAIN SCALES - GENERAL
PAINLEVEL_OUTOF10: 10-WORST PAIN EVER
PAINLEVEL_OUTOF10: 10 - WORST POSSIBLE PAIN

## 2024-11-13 ASSESSMENT — ENCOUNTER SYMPTOMS
LOSS OF SENSATION IN FEET: 0
DEPRESSION: 0
OCCASIONAL FEELINGS OF UNSTEADINESS: 0

## 2024-11-13 ASSESSMENT — PAIN DESCRIPTION - DESCRIPTORS: DESCRIPTORS: STABBING

## 2024-11-13 NOTE — PROGRESS NOTES
Right buttock pain down right leg and back pain. Pain started Saturday. 10/10 pain and takes percocet as prescribed. Has 12 left and takes 2 a day. Would like to discuss an injection.

## 2024-11-13 NOTE — PROGRESS NOTES
Subjective   Patient ID: Gaurav Rehman is a 74 y.o. male today complaining of severe right anterior lateral thigh pain down his ankle.  He has had a previous lumbar laminectomy at the L5-S1 level and has had periodic episodes of low back pain radiating down his right lower extremity.  This pain is intense and rated be 7-8 on a 1-10 scale and nearly constant nature.  Risk and benefits of transforaminal ROSALINDA at L4-5 L5-S1 on the right was discussed with the patient would be scheduled in 1 week under direct fluoroscopy and local anesthesia    HPI  Past medical history for previous lumbar laminectomy L5-S1; history of PAT; mitral regurg  Review of Systems  Unremarkable except chief complaint Objective   Physical Exam  Gen. appearance:  Patient's alert and oriented ×3 ;cooperative mild to moderate distress  Heart: Regular rate and rhythm  Lungs: Clear to auscultation  Abdomen: Soft nontender  Neuro: Cranial nerves II -XII intact; DTR: +2 over 4 biceps triceps brachial radialis patellar and Achilles  Spinal exam: Positive paraspinal tenderness noted bilaterally L4 5 L5-S1 with flexion extension and rotation/no bony abnormalities  Musculoskeletal:  Upper and lower extremity strength was 4/5 bilaterally  :  deferred  Skin: Warm and dry      Assessment/Plan   Diagnoses and all orders for this visit:  Lumbar radiculopathy, chronic  -     FL pain management; Future  -     Transforaminal; Future  Lumbar stenosis with neurogenic claudication  -     FL pain management; Future  -     Transforaminal; Future  Patient scheduled for right L L4-5 and L5-S1 transforaminal ROSALINDA under direct fluoroscopy on November 21, 2024.  Patient is willing to proceed under local anesthesia.  No pain medication was given to him at this time.  He was also told to be n.p.o. for 6 hours prior to scheduled procedure.

## 2024-11-18 ENCOUNTER — APPOINTMENT (OUTPATIENT)
Dept: CARDIOLOGY | Facility: CLINIC | Age: 74
End: 2024-11-18
Payer: MEDICARE

## 2024-11-18 VITALS
WEIGHT: 172.6 LBS | BODY MASS INDEX: 24.07 KG/M2 | DIASTOLIC BLOOD PRESSURE: 70 MMHG | OXYGEN SATURATION: 97 % | HEART RATE: 65 BPM | SYSTOLIC BLOOD PRESSURE: 136 MMHG

## 2024-11-18 DIAGNOSIS — I47.19 PAROXYSMAL ATRIAL TACHYCARDIA (CMS-HCC): ICD-10-CM

## 2024-11-18 DIAGNOSIS — I07.1 MODERATE TRICUSPID REGURGITATION: ICD-10-CM

## 2024-11-18 DIAGNOSIS — I35.1 MILD AORTIC REGURGITATION: ICD-10-CM

## 2024-11-18 DIAGNOSIS — I10 PRIMARY HYPERTENSION: ICD-10-CM

## 2024-11-18 DIAGNOSIS — Z79.899 HIGH RISK MEDICATION USE: ICD-10-CM

## 2024-11-18 DIAGNOSIS — I71.21 ANEURYSM OF ASCENDING AORTA WITHOUT RUPTURE (CMS-HCC): Primary | ICD-10-CM

## 2024-11-18 DIAGNOSIS — I34.0 MODERATE MITRAL REGURGITATION: ICD-10-CM

## 2024-11-18 DIAGNOSIS — E78.5 HYPERLIPIDEMIA, UNSPECIFIED HYPERLIPIDEMIA TYPE: ICD-10-CM

## 2024-11-18 PROCEDURE — 99214 OFFICE O/P EST MOD 30 MIN: CPT | Performed by: INTERNAL MEDICINE

## 2024-11-18 PROCEDURE — 3075F SYST BP GE 130 - 139MM HG: CPT | Performed by: INTERNAL MEDICINE

## 2024-11-18 PROCEDURE — 3078F DIAST BP <80 MM HG: CPT | Performed by: INTERNAL MEDICINE

## 2024-11-18 PROCEDURE — 1036F TOBACCO NON-USER: CPT | Performed by: INTERNAL MEDICINE

## 2024-11-18 PROCEDURE — 93000 ELECTROCARDIOGRAM COMPLETE: CPT | Performed by: INTERNAL MEDICINE

## 2024-11-18 NOTE — PROGRESS NOTES
Subjective   Gaurav Rehman is a 74 y.o. male.    Chief Complaint:  Follow-up aortic aneurysm, coronary disease.    HPI    He has been under a lot of stress because his sister  and his brother Iggy who we have followed for a long time has had deterioration in his overall status.  Personally he feels well.  He has had major issues with his back with chronic back pain and sciatica on the right leg.  No anginal symptoms.  Continues to stay active and bowls on a regular basis.    Cardiac problems include moderate mitral regurgitation, moderate tricuspid regurgitation, supraventricular tachycardia, ascending aortic aneurysm, mild aortic regurgitation.     The patient has a history of sinus of Valsalva aneurysm. This measures around 4.7 cm. This has remained unchanged. He has a paroxysmal atrial tachycardia which is well controlled on sotalol therapy. He has a history of hypertension and hyperlipidemia.     He has a past history of a paroxysmal atrial tachycardia. He was referred for ablation a few years ago. However this was not successful. He was started on sotalol therapy and has been on sotalol therapy for a few years.     He has a history of complex congenital heart repair in . He had a repair of ventricular septal defect. He had treatment of pulmonic stenosis. He had aortic root surgery with implantation of the coronary arteries.     Allergies  Medication    · No Known Drug Allergies   Recorded By: Laurie Spears; 2017 11:52:21 AM     Family History  Mother    · Family history of emphysema (V17.6) (Z82.5)   · Family history of throat cancer (V16.0) (Z80.0)  Father    · Family history of throat cancer (V16.0) (Z80.0)  Other    · No family history of cardiac disease     Social History  Problems    · Former smoker (V15.82) (Z87.891)   ·    · No illicit drug use   · Occasional alcohol use        Review of Systems   Constitutional: Positive for malaise/fatigue.   Cardiovascular:  Positive for  dyspnea on exertion.   Musculoskeletal:  Positive for arthritis, back pain and joint pain.     Current Outpatient Medications   Medication Sig Dispense Refill    acetaminophen (Tylenol) 500 mg tablet Take by mouth every 6 hours if needed.      amLODIPine (Norvasc) 5 mg tablet TAKE 1 TABLET EVERY DAY 90 tablet 3    cetirizine (ZyrTEC) 10 mg tablet Take 1 tablet (10 mg) by mouth once daily. As needed      furosemide (Lasix) 20 mg tablet TAKE 1 TABLET EVERY DAY 90 tablet 3    losartan (Cozaar) 100 mg tablet Take 1 tablet (100 mg) by mouth once daily. 90 tablet 3    rosuvastatin (Crestor) 40 mg tablet Take 1 tablet (40 mg) by mouth once daily. 90 tablet 3    sildenafil (Viagra) 100 mg tablet Take by mouth if needed.      sotalol (Betapace) 80 mg tablet TAKE 1 TABLET EVERY 12 HOURS 180 tablet 3     No current facility-administered medications for this visit.        Visit Vitals  /70 (BP Location: Right arm, Patient Position: Sitting)   Pulse 65   Wt 78.3 kg (172 lb 9.6 oz)   SpO2 97%   BMI 24.07 kg/m²   Smoking Status Former   BSA 1.98 m²        Objective     Constitutional:       Appearance: Not in distress.   Neck:      Vascular: JVD normal.   Pulmonary:      Breath sounds: Normal breath sounds.   Cardiovascular:      Normal rate. Regular rhythm. S1 with normal intensity. S2 with normal intensity.       Murmurs: There is no murmur.      No gallop.    Pulses:     Intact distal pulses.   Edema:     Peripheral edema absent.   Abdominal:      General: Bowel sounds are normal.   Neurological:      Mental Status: Alert and oriented to person, place and time.         Lab Review:   Lab Results   Component Value Date     05/15/2024    K 4.3 05/15/2024     (H) 05/15/2024    CO2 25 05/15/2024    BUN 43 (H) 05/15/2024    CREATININE 1.32 (H) 05/15/2024    GLUCOSE 101 (H) 05/15/2024    CALCIUM 9.3 05/15/2024     Lab Results   Component Value Date    CHOL 102 05/15/2024    TRIG 120 05/15/2024    HDL 42.8 05/15/2024        Assessment:    1.  Coronary disease.  Mild by prior evaluation of a CT scan of the chest done in 2023.    2.  Sinus of Valsalva aneurysm.  Has measured 4.5 cm to 4.7 cm on prior evaluations.  This numbers been stable.  Will repeat echo study on his next visit.    3.  Paroxysmal atrial tachycardia.  Doing well on sotalol therapy with no breakthroughs.    4.  Moderate mitral regurgitation.  No evidence of left heart failure.    5.  Moderate tricuspid regurgitation.  No evidence of right heart failure.  No increased peripheral edema.    6.  Hyperlipidemia.  Cholesterol 102, HDL 43, LDL 35.    7.  Renal sufficiency.  Potassium 4.3, BUN 43, creatinine 1.32.  We may be able to reduce diuretics in the future.    8.  Hypertension.  Blood pressures were quite variable during his office visit.  We have asked him to check his blood pressures at home and keep a record.    Will defer to Dr. Mustafa for his next blood work.

## 2024-11-18 NOTE — PATIENT INSTRUCTIONS
Check your blood pressure at home about 3 days per week and keep a record.    We will have Dr. Mustafa order your next blood work.

## 2024-11-18 NOTE — LETTER
2024     Don Mustafa MD  [id number] [facility] [id number]    Patient: Gaurav Rehman   YOB: 1950   Date of Visit: 2024       Dear Dr. Don Mustafa MD:    Thank you for referring Gaurav Rehman to me for evaluation. Below are my notes for this consultation.  If you have questions, please do not hesitate to call me. I look forward to following your patient along with you.       Sincerely,     Jeffry Toro MD      CC: No Recipients  ______________________________________________________________________________________    Subjective  Gaurav Rehman is a 74 y.o. male.    Chief Complaint:  Follow-up aortic aneurysm, coronary disease.    HPI    He has been under a lot of stress because his sister  and his brother Iggy who we have followed for a long time has had deterioration in his overall status.  Personally he feels well.  He has had major issues with his back with chronic back pain and sciatica on the right leg.  No anginal symptoms.  Continues to stay active and bowls on a regular basis.    Cardiac problems include moderate mitral regurgitation, moderate tricuspid regurgitation, supraventricular tachycardia, ascending aortic aneurysm, mild aortic regurgitation.     The patient has a history of sinus of Valsalva aneurysm. This measures around 4.7 cm. This has remained unchanged. He has a paroxysmal atrial tachycardia which is well controlled on sotalol therapy. He has a history of hypertension and hyperlipidemia.     He has a past history of a paroxysmal atrial tachycardia. He was referred for ablation a few years ago. However this was not successful. He was started on sotalol therapy and has been on sotalol therapy for a few years.     He has a history of complex congenital heart repair in . He had a repair of ventricular septal defect. He had treatment of pulmonic stenosis. He had aortic root surgery with implantation of the coronary arteries.      Allergies  Medication    · No Known Drug Allergies   Recorded By: Laurie Spears; 11/16/2017 11:52:21 AM     Family History  Mother    · Family history of emphysema (V17.6) (Z82.5)   · Family history of throat cancer (V16.0) (Z80.0)  Father    · Family history of throat cancer (V16.0) (Z80.0)  Other    · No family history of cardiac disease     Social History  Problems    · Former smoker (V15.82) (Z87.891)   ·    · No illicit drug use   · Occasional alcohol use        Review of Systems   Constitutional: Positive for malaise/fatigue.   Cardiovascular:  Positive for dyspnea on exertion.   Musculoskeletal:  Positive for arthritis, back pain and joint pain.     Current Outpatient Medications   Medication Sig Dispense Refill   • acetaminophen (Tylenol) 500 mg tablet Take by mouth every 6 hours if needed.     • amLODIPine (Norvasc) 5 mg tablet TAKE 1 TABLET EVERY DAY 90 tablet 3   • cetirizine (ZyrTEC) 10 mg tablet Take 1 tablet (10 mg) by mouth once daily. As needed     • furosemide (Lasix) 20 mg tablet TAKE 1 TABLET EVERY DAY 90 tablet 3   • losartan (Cozaar) 100 mg tablet Take 1 tablet (100 mg) by mouth once daily. 90 tablet 3   • rosuvastatin (Crestor) 40 mg tablet Take 1 tablet (40 mg) by mouth once daily. 90 tablet 3   • sildenafil (Viagra) 100 mg tablet Take by mouth if needed.     • sotalol (Betapace) 80 mg tablet TAKE 1 TABLET EVERY 12 HOURS 180 tablet 3     No current facility-administered medications for this visit.        Visit Vitals  /70 (BP Location: Right arm, Patient Position: Sitting)   Pulse 65   Wt 78.3 kg (172 lb 9.6 oz)   SpO2 97%   BMI 24.07 kg/m²   Smoking Status Former   BSA 1.98 m²        Objective    Constitutional:       Appearance: Not in distress.   Neck:      Vascular: JVD normal.   Pulmonary:      Breath sounds: Normal breath sounds.   Cardiovascular:      Normal rate. Regular rhythm. S1 with normal intensity. S2 with normal intensity.       Murmurs: There is no murmur.       No gallop.    Pulses:     Intact distal pulses.   Edema:     Peripheral edema absent.   Abdominal:      General: Bowel sounds are normal.   Neurological:      Mental Status: Alert and oriented to person, place and time.         Lab Review:   Lab Results   Component Value Date     05/15/2024    K 4.3 05/15/2024     (H) 05/15/2024    CO2 25 05/15/2024    BUN 43 (H) 05/15/2024    CREATININE 1.32 (H) 05/15/2024    GLUCOSE 101 (H) 05/15/2024    CALCIUM 9.3 05/15/2024     Lab Results   Component Value Date    CHOL 102 05/15/2024    TRIG 120 05/15/2024    HDL 42.8 05/15/2024       Assessment:    1.  Coronary disease.  Mild by prior evaluation of a CT scan of the chest done in 2023.    2.  Sinus of Valsalva aneurysm.  Has measured 4.5 cm to 4.7 cm on prior evaluations.  This numbers been stable.  Will repeat echo study on his next visit.    3.  Paroxysmal atrial tachycardia.  Doing well on sotalol therapy with no breakthroughs.    4.  Moderate mitral regurgitation.  No evidence of left heart failure.    5.  Moderate tricuspid regurgitation.  No evidence of right heart failure.  No increased peripheral edema.    6.  Hyperlipidemia.  Cholesterol 102, HDL 43, LDL 35.    7.  Renal sufficiency.  Potassium 4.3, BUN 43, creatinine 1.32.  We may be able to reduce diuretics in the future.    8.  Hypertension.  Blood pressures were quite variable during his office visit.  We have asked him to check his blood pressures at home and keep a record.    Will defer to Dr. Mustafa for his next blood work.

## 2024-11-20 ENCOUNTER — APPOINTMENT (OUTPATIENT)
Dept: PAIN MEDICINE | Facility: CLINIC | Age: 74
End: 2024-11-20
Payer: MEDICARE

## 2024-11-21 ENCOUNTER — HOSPITAL ENCOUNTER (OUTPATIENT)
Dept: PAIN MEDICINE | Facility: CLINIC | Age: 74
Discharge: HOME | End: 2024-11-21
Payer: MEDICARE

## 2024-11-21 VITALS
SYSTOLIC BLOOD PRESSURE: 178 MMHG | TEMPERATURE: 97.7 F | HEIGHT: 71 IN | OXYGEN SATURATION: 97 % | RESPIRATION RATE: 18 BRPM | HEART RATE: 65 BPM | DIASTOLIC BLOOD PRESSURE: 86 MMHG | WEIGHT: 163 LBS | BODY MASS INDEX: 22.82 KG/M2

## 2024-11-21 DIAGNOSIS — M54.16 LUMBAR RADICULOPATHY, CHRONIC: ICD-10-CM

## 2024-11-21 DIAGNOSIS — M96.1 POSTLAMINECTOMY SYNDROME, LUMBAR REGION: ICD-10-CM

## 2024-11-21 DIAGNOSIS — Z79.899 HIGH RISK MEDICATION USE: ICD-10-CM

## 2024-11-21 DIAGNOSIS — M48.061 SPINAL STENOSIS, LUMBAR REGION, WITHOUT NEUROGENIC CLAUDICATION: ICD-10-CM

## 2024-11-21 DIAGNOSIS — M48.062 LUMBAR STENOSIS WITH NEUROGENIC CLAUDICATION: ICD-10-CM

## 2024-11-21 DIAGNOSIS — M54.16 LUMBAR RADICULOPATHY: Primary | ICD-10-CM

## 2024-11-21 PROCEDURE — 64484 NJX AA&/STRD TFRM EPI L/S EA: CPT | Performed by: ANESTHESIOLOGY

## 2024-11-21 PROCEDURE — 64483 NJX AA&/STRD TFRM EPI L/S 1: CPT | Performed by: ANESTHESIOLOGY

## 2024-11-21 PROCEDURE — 2500000004 HC RX 250 GENERAL PHARMACY W/ HCPCS (ALT 636 FOR OP/ED): Performed by: ANESTHESIOLOGY

## 2024-11-21 PROCEDURE — 2550000001 HC RX 255 CONTRASTS: Performed by: ANESTHESIOLOGY

## 2024-11-21 RX ORDER — LIDOCAINE HYDROCHLORIDE 20 MG/ML
INJECTION, SOLUTION EPIDURAL; INFILTRATION; INTRACAUDAL; PERINEURAL AS NEEDED
Status: COMPLETED | OUTPATIENT
Start: 2024-11-21 | End: 2024-11-21

## 2024-11-21 RX ORDER — OXYCODONE AND ACETAMINOPHEN 5; 325 MG/1; MG/1
1 TABLET ORAL EVERY 8 HOURS PRN
Qty: 90 TABLET | Refills: 0 | Status: SHIPPED | OUTPATIENT
Start: 2024-11-21 | End: 2024-12-21

## 2024-11-21 RX ORDER — BETAMETHASONE SODIUM PHOSPHATE AND BETAMETHASONE ACETATE 3; 3 MG/ML; MG/ML
INJECTION, SUSPENSION INTRA-ARTICULAR; INTRALESIONAL; INTRAMUSCULAR; SOFT TISSUE AS NEEDED
Status: COMPLETED | OUTPATIENT
Start: 2024-11-21 | End: 2024-11-21

## 2024-11-21 SDOH — ECONOMIC STABILITY: FOOD INSECURITY: WITHIN THE PAST 12 MONTHS, THE FOOD YOU BOUGHT JUST DIDN'T LAST AND YOU DIDN'T HAVE MONEY TO GET MORE.: NEVER TRUE

## 2024-11-21 SDOH — ECONOMIC STABILITY: FOOD INSECURITY: WITHIN THE PAST 12 MONTHS, YOU WORRIED THAT YOUR FOOD WOULD RUN OUT BEFORE YOU GOT MONEY TO BUY MORE.: NEVER TRUE

## 2024-11-21 ASSESSMENT — PATIENT HEALTH QUESTIONNAIRE - PHQ9
1. LITTLE INTEREST OR PLEASURE IN DOING THINGS: NOT AT ALL
2. FEELING DOWN, DEPRESSED OR HOPELESS: NOT AT ALL
SUM OF ALL RESPONSES TO PHQ9 QUESTIONS 1 & 2: 0

## 2024-11-21 ASSESSMENT — LIFESTYLE VARIABLES
HOW OFTEN DO YOU HAVE SIX OR MORE DRINKS ON ONE OCCASION: NEVER
AUDIT-C TOTAL SCORE: 0
SKIP TO QUESTIONS 9-10: 1
HOW MANY STANDARD DRINKS CONTAINING ALCOHOL DO YOU HAVE ON A TYPICAL DAY: PATIENT DOES NOT DRINK
HOW OFTEN DO YOU HAVE A DRINK CONTAINING ALCOHOL: NEVER

## 2024-11-21 ASSESSMENT — PAIN DESCRIPTION - DESCRIPTORS: DESCRIPTORS: ACHING;RADIATING

## 2024-11-21 ASSESSMENT — PAIN - FUNCTIONAL ASSESSMENT
PAIN_FUNCTIONAL_ASSESSMENT: 0-10
PAIN_FUNCTIONAL_ASSESSMENT: 0-10

## 2024-11-21 ASSESSMENT — ENCOUNTER SYMPTOMS
LOSS OF SENSATION IN FEET: 0
DEPRESSION: 0
OCCASIONAL FEELINGS OF UNSTEADINESS: 1

## 2024-11-21 ASSESSMENT — PAIN SCALES - GENERAL
PAINLEVEL_OUTOF10: 9
PAINLEVEL_OUTOF10: 6

## 2024-12-15 NOTE — PROGRESS NOTES
Subjective   Patient ID: Gaurav Rehman is a 74 y.o. male who presents for  A YEARLY CHECK UP ON HIS PROSTATE.  NO FAMILY H/O PROSTATE DANCER.  PT WAS NOT IN [facility]  HPI  Are you experiencing:  Burning on urination -- NO  Pain on urination  -- NO  Urinary frequency -- OCC WITH DIURETIC THERAPY   Urinary urgency -- RARE  Urge incontinence -- RARE--FEW DROPS  Urinary stress incontinence  -- NO  Number of pads used per day --NONE  Enuresis -- NO  Nocturia-- RARELY   Hematuria --NO  Hesitancy -- NO  Post void fullness --  NO    Review of Systems  General-- No C/O fever or chills  Head-- No C/O Dizziness  Eyes-- NO  C/O blurry or double vision  Ears-- No C/O hearing loss  Neck-- Supple  Chest- NO C/O pain or discomfort  Lungs-- OCC C/O shortness of breath IN THE MORNING ONLY   Abdomen-- No C/O  pain or discomfort, No nausea or vomiting  Back-- PT  C/O back pain  SECONDARY TO L-S DDD  Extremities-- No C/O swelling or pain    Objective   Physical Exam    General-- well developed, well nourished in NAD  Head-- normal cephalic, atraumatic  Eyes-- PERRL, EOM'S FROM,  no  jaundice  Neck-- Supple, without masses  Chest-- Normal bony structure  Abdomen-- soft, non tender, liver spleen not palpable . No supra pubic masses  Back-- no flank masses palpable, no CVA tenderness on palpation or perc;ussion  Lymph nodes-- No inguinal lymphadenopathy noted  Prostate-- 2 +, firm, smooth, non tender,without nodules  Testis-- both down, non tender, without masses  Epididymis-- 2 CM SPERMATOCELE TAIL OF THE RIGHT EPIDIDYMIS   Scrotum -- no hydrocele noted  Extremities -- Normal muscle mass and tone for the patients age  Neurological-- oriented times three    PSA  3-21-23-- 1.19    URINALYSIS DIPSTICK-- WNL    Assessment/Plan  A:  NORMAL FEELING PROSTATE  MILD VOIDING SXS -- TOLERABLE   SPERMATOCELE TAIL OF THE RIGHT EPIDIDYMIS  P:  OBTAIN OLD RECORDS FROM [facility]  REASSURANCE, EDUCATION, SUPPORTIVE CARE RENDERED TO THE PT  F/ U IN ONE YEAR - HIREN  HAVE STOPPED DOING THE PSA GIVEN HIS AGE   Ruben Nobles MD 12/15/24 12:40 PM

## 2024-12-16 ENCOUNTER — OFFICE VISIT (OUTPATIENT)
Dept: UROLOGY | Facility: CLINIC | Age: 74
End: 2024-12-16
Payer: MEDICARE

## 2024-12-16 VITALS
DIASTOLIC BLOOD PRESSURE: 59 MMHG | SYSTOLIC BLOOD PRESSURE: 121 MMHG | TEMPERATURE: 98.6 F | HEART RATE: 70 BPM | HEIGHT: 71 IN | WEIGHT: 163 LBS | BODY MASS INDEX: 22.82 KG/M2

## 2024-12-16 DIAGNOSIS — R35.0 FREQUENCY OF MICTURITION: ICD-10-CM

## 2024-12-16 DIAGNOSIS — R39.15 URGENCY OF MICTURITION: Primary | ICD-10-CM

## 2024-12-16 DIAGNOSIS — N40.1 BENIGN PROSTATIC HYPERPLASIA WITH LOWER URINARY TRACT SYMPTOMS, SYMPTOM DETAILS UNSPECIFIED: ICD-10-CM

## 2024-12-16 DIAGNOSIS — R35.1 NOCTURIA: ICD-10-CM

## 2024-12-16 DIAGNOSIS — N43.41: ICD-10-CM

## 2024-12-16 DIAGNOSIS — N39.41 URGENCY INCONTINENCE: ICD-10-CM

## 2024-12-16 LAB
POC APPEARANCE, URINE: CLEAR
POC BILIRUBIN, URINE: NEGATIVE
POC BLOOD, URINE: NEGATIVE
POC COLOR, URINE: YELLOW
POC GLUCOSE, URINE: NEGATIVE MG/DL
POC KETONES, URINE: NEGATIVE MG/DL
POC LEUKOCYTES, URINE: NEGATIVE
POC NITRITE,URINE: NEGATIVE
POC PH, URINE: 5.5 PH
POC PROTEIN, URINE: NEGATIVE MG/DL
POC SPECIFIC GRAVITY, URINE: 1.02
POC UROBILINOGEN, URINE: 0.2 EU/DL

## 2024-12-16 PROCEDURE — 1126F AMNT PAIN NOTED NONE PRSNT: CPT | Performed by: UROLOGY

## 2024-12-16 PROCEDURE — 3074F SYST BP LT 130 MM HG: CPT | Performed by: UROLOGY

## 2024-12-16 PROCEDURE — 81003 URINALYSIS AUTO W/O SCOPE: CPT | Mod: QW | Performed by: UROLOGY

## 2024-12-16 PROCEDURE — 99214 OFFICE O/P EST MOD 30 MIN: CPT | Performed by: UROLOGY

## 2024-12-16 PROCEDURE — 1160F RVW MEDS BY RX/DR IN RCRD: CPT | Performed by: UROLOGY

## 2024-12-16 PROCEDURE — 1159F MED LIST DOCD IN RCRD: CPT | Performed by: UROLOGY

## 2024-12-16 PROCEDURE — 3078F DIAST BP <80 MM HG: CPT | Performed by: UROLOGY

## 2024-12-16 RX ORDER — METHYLPREDNISOLONE 4 MG/1
TABLET ORAL
COMMUNITY
Start: 2024-11-25

## 2024-12-16 ASSESSMENT — PATIENT HEALTH QUESTIONNAIRE - PHQ9
2. FEELING DOWN, DEPRESSED OR HOPELESS: SEVERAL DAYS
10. IF YOU CHECKED OFF ANY PROBLEMS, HOW DIFFICULT HAVE THESE PROBLEMS MADE IT FOR YOU TO DO YOUR WORK, TAKE CARE OF THINGS AT HOME, OR GET ALONG WITH OTHER PEOPLE: SOMEWHAT DIFFICULT
1. LITTLE INTEREST OR PLEASURE IN DOING THINGS: NOT AT ALL
SUM OF ALL RESPONSES TO PHQ9 QUESTIONS 1 AND 2: 1

## 2024-12-16 ASSESSMENT — LIFESTYLE VARIABLES
HOW OFTEN DO YOU HAVE SIX OR MORE DRINKS ON ONE OCCASION: NEVER
HOW OFTEN DO YOU HAVE A DRINK CONTAINING ALCOHOL: MONTHLY OR LESS
HOW MANY STANDARD DRINKS CONTAINING ALCOHOL DO YOU HAVE ON A TYPICAL DAY: 1 OR 2
SKIP TO QUESTIONS 9-10: 1
AUDIT-C TOTAL SCORE: 1

## 2024-12-16 ASSESSMENT — ENCOUNTER SYMPTOMS
LOSS OF SENSATION IN FEET: 0
OCCASIONAL FEELINGS OF UNSTEADINESS: 0
DEPRESSION: 1

## 2024-12-16 ASSESSMENT — PAIN SCALES - GENERAL: PAINLEVEL_OUTOF10: 0-NO PAIN

## 2024-12-16 NOTE — LETTER
December 17, 2024     Don Mustafa MD  [id number] [facility] [id number]    Patient: Gaurav Rehman   YOB: 1950   Date of Visit: 12/16/2024       Dear Dr. Don Mustafa MD:    Thank you for referring Gaurav Rehman to me for evaluation. Below are my notes for this consultation.  If you have questions, please do not hesitate to call me. I look forward to following your patient along with you.       Sincerely,     Ruben Nobles MD      CC: No Recipients  ______________________________________________________________________________________    Subjective  Patient ID: Gaurav Rehman is a 74 y.o. male who presents for  A YEARLY CHECK UP ON HIS PROSTATE.  NO FAMILY H/O PROSTATE DANCER.  PT WAS NOT IN [facility]  HPI  Are you experiencing:  Burning on urination -- NO  Pain on urination  -- NO  Urinary frequency -- OCC WITH DIURETIC THERAPY   Urinary urgency -- RARE  Urge incontinence -- RARE--FEW DROPS  Urinary stress incontinence  -- NO  Number of pads used per day --NONE  Enuresis -- NO  Nocturia-- RARELY   Hematuria --NO  Hesitancy -- NO  Post void fullness --  NO    Review of Systems  General-- No C/O fever or chills  Head-- No C/O Dizziness  Eyes-- NO  C/O blurry or double vision  Ears-- No C/O hearing loss  Neck-- Supple  Chest- NO C/O pain or discomfort  Lungs-- OCC C/O shortness of breath IN THE MORNING ONLY   Abdomen-- No C/O  pain or discomfort, No nausea or vomiting  Back-- PT  C/O back pain  SECONDARY TO L-S DDD  Extremities-- No C/O swelling or pain    Objective   Physical Exam    General-- well developed, well nourished in NAD  Head-- normal cephalic, atraumatic  Eyes-- PERRL, EOM'S FROM,  no  jaundice  Neck-- Supple, without masses  Chest-- Normal bony structure  Abdomen-- soft, non tender, liver spleen not palpable . No supra pubic masses  Back-- no flank masses palpable, no CVA tenderness on palpation or perc;ussion  Lymph nodes-- No inguinal lymphadenopathy noted  Prostate-- 2 +, firm,  smooth, non tender,without nodules  Testis-- both down, non tender, without masses  Epididymis-- 2 CM SPERMATOCELE TAIL OF THE RIGHT EPIDIDYMIS   Scrotum -- no hydrocele noted  Extremities -- Normal muscle mass and tone for the patients age  Neurological-- oriented times three    PSA  3-21-23-- 1.19    URINALYSIS DIPSTICK-- WNL    Assessment/Plan  A:  NORMAL FEELING PROSTATE  MILD VOIDING SXS -- TOLERABLE   SPERMATOCELE TAIL OF THE RIGHT EPIDIDYMIS  P:  OBTAIN OLD RECORDS FROM U  REASSURANCE, EDUCATION, SUPPORTIVE CARE RENDERED TO THE PT  F/ U IN ONE YEAR - WE HAVE STOPPED DOING THE PSA GIVEN HIS AGE   Ruben Nobles MD 12/15/24 12:40 PM

## 2024-12-16 NOTE — LETTER
December 17, 2024     Don Mustafa MD  [address] [facility] [place] [id number]    Patient: Gaurav Rehman   YOB: 1950   Date of Visit: 12/16/2024       Dear Dr. Don Mustafa MD:    Thank you for referring Gaurav Rehman to me for evaluation. Below are my notes for this consultation.  If you have questions, please do not hesitate to call me. I look forward to following your patient along with you.       Sincerely,     Ruben Nobles MD      CC: No Recipients  ______________________________________________________________________________________    Subjective  Patient ID: Gaurav Rehman is a 74 y.o. male who presents for  A YEARLY CHECK UP ON HIS PROSTATE.  NO FAMILY H/O PROSTATE DANCER.  PT WAS NOT IN [facility]  HPI  Are you experiencing:  Burning on urination -- NO  Pain on urination  -- NO  Urinary frequency -- OCC WITH DIURETIC THERAPY   Urinary urgency -- RARE  Urge incontinence -- RARE--FEW DROPS  Urinary stress incontinence  -- NO  Number of pads used per day --NONE  Enuresis -- NO  Nocturia-- RARELY   Hematuria --NO  Hesitancy -- NO  Post void fullness --  NO    Review of Systems  General-- No C/O fever or chills  Head-- No C/O Dizziness  Eyes-- NO  C/O blurry or double vision  Ears-- No C/O hearing loss  Neck-- Supple  Chest- NO C/O pain or discomfort  Lungs-- OCC C/O shortness of breath IN THE MORNING ONLY   Abdomen-- No C/O  pain or discomfort, No nausea or vomiting  Back-- PT  C/O back pain  SECONDARY TO L-S DDD  Extremities-- No C/O swelling or pain    Objective   Physical Exam    General-- well developed, well nourished in NAD  Head-- normal cephalic, atraumatic  Eyes-- PERRL, EOM'S FROM,  no  jaundice  Neck-- Supple, without masses  Chest-- Normal bony structure  Abdomen-- soft, non tender, liver spleen not palpable . No supra pubic masses  Back-- no flank masses palpable, no CVA tenderness on palpation or perc;ussion  Lymph nodes-- No inguinal lymphadenopathy noted  Prostate-- 2 +, firm,  smooth, non tender,without nodules  Testis-- both down, non tender, without masses  Epididymis-- 2 CM SPERMATOCELE TAIL OF THE RIGHT EPIDIDYMIS   Scrotum -- no hydrocele noted  Extremities -- Normal muscle mass and tone for the patients age  Neurological-- oriented times three    PSA  3-21-23-- 1.19    URINALYSIS DIPSTICK-- WNL    Assessment/Plan  A:  NORMAL FEELING PROSTATE  MILD VOIDING SXS -- TOLERABLE   SPERMATOCELE TAIL OF THE RIGHT EPIDIDYMIS  P:  OBTAIN OLD RECORDS FROM U  REASSURANCE, EDUCATION, SUPPORTIVE CARE RENDERED TO THE PT  F/ U IN ONE YEAR - WE HAVE STOPPED DOING THE PSA GIVEN HIS AGE   Ruben Nobles MD 12/15/24 12:40 PM

## 2025-01-02 ENCOUNTER — OFFICE VISIT (OUTPATIENT)
Dept: PAIN MEDICINE | Facility: CLINIC | Age: 75
End: 2025-01-02
Payer: MEDICARE

## 2025-01-02 VITALS
OXYGEN SATURATION: 99 % | RESPIRATION RATE: 18 BRPM | DIASTOLIC BLOOD PRESSURE: 80 MMHG | SYSTOLIC BLOOD PRESSURE: 199 MMHG | HEART RATE: 65 BPM | TEMPERATURE: 97.3 F

## 2025-01-02 DIAGNOSIS — M48.061 SPINAL STENOSIS, LUMBAR REGION, WITHOUT NEUROGENIC CLAUDICATION: ICD-10-CM

## 2025-01-02 DIAGNOSIS — M54.16 LUMBAR RADICULOPATHY: ICD-10-CM

## 2025-01-02 DIAGNOSIS — M96.1 POSTLAMINECTOMY SYNDROME, LUMBAR REGION: ICD-10-CM

## 2025-01-02 DIAGNOSIS — Z79.899 HIGH RISK MEDICATION USE: ICD-10-CM

## 2025-01-02 DIAGNOSIS — M47.817 MULTILEVEL LUMBOSACRAL SPONDYLOSIS WITHOUT MYELOPATHY: Primary | ICD-10-CM

## 2025-01-02 PROCEDURE — 99213 OFFICE O/P EST LOW 20 MIN: CPT | Performed by: ANESTHESIOLOGY

## 2025-01-02 PROCEDURE — 3079F DIAST BP 80-89 MM HG: CPT | Performed by: ANESTHESIOLOGY

## 2025-01-02 PROCEDURE — 1160F RVW MEDS BY RX/DR IN RCRD: CPT | Performed by: ANESTHESIOLOGY

## 2025-01-02 PROCEDURE — 3077F SYST BP >= 140 MM HG: CPT | Performed by: ANESTHESIOLOGY

## 2025-01-02 PROCEDURE — 1125F AMNT PAIN NOTED PAIN PRSNT: CPT | Performed by: ANESTHESIOLOGY

## 2025-01-02 PROCEDURE — 1159F MED LIST DOCD IN RCRD: CPT | Performed by: ANESTHESIOLOGY

## 2025-01-02 PROCEDURE — 1036F TOBACCO NON-USER: CPT | Performed by: ANESTHESIOLOGY

## 2025-01-02 RX ORDER — OXYCODONE AND ACETAMINOPHEN 5; 325 MG/1; MG/1
1 TABLET ORAL EVERY 12 HOURS PRN
Qty: 90 TABLET | Refills: 0 | Status: SHIPPED | OUTPATIENT
Start: 2025-01-02 | End: 2025-02-01

## 2025-01-02 ASSESSMENT — PAIN DESCRIPTION - DESCRIPTORS: DESCRIPTORS: ACHING;SHARP

## 2025-01-02 ASSESSMENT — PAIN SCALES - GENERAL
PAINLEVEL_OUTOF10: 8
PAINLEVEL_OUTOF10: 8

## 2025-01-02 ASSESSMENT — ENCOUNTER SYMPTOMS
DEPRESSION: 0
OCCASIONAL FEELINGS OF UNSTEADINESS: 0
LOSS OF SENSATION IN FEET: 0

## 2025-01-02 ASSESSMENT — PAIN - FUNCTIONAL ASSESSMENT: PAIN_FUNCTIONAL_ASSESSMENT: 0-10

## 2025-01-02 NOTE — PROGRESS NOTES
11/21/24 right lumbar transforaminal epidural. 40-50% relief from this procedure. Was given oral steroids from pcp. Pain today 8/10 low back down right leg. Takes percocet for pain. Has 3 left and takes 2 a day.

## 2025-01-02 NOTE — PROGRESS NOTES
Subjective   Patient ID: Gaurav Rehman is a 74 y.o. male who presents for Pain (Low back right leg pain).  Patient has a previous history of a lumbar laminectomy at L5-S1 level with chronic pain syndrome.  He has been trialed on tramadol 50 mg as well as hydrocodone which have not worked well for him.  Patient takes 2 Percocet tablets a day to moderators pain.  A controlled substance agreement was signed today as well is previous UDS which was done on on 8/21/2024 successfully  HPI  History of chronic pain syndrome; lumbar spondylosis; post lumbar laminectomy syndrome; opioid dependency for pain control  Review of Systems  Unremarkable except chief complaint  Objective   Physical Exam  Gen. appearance:  Patient's alert and oriented ×3 ;cooperative mild to moderate distress  Heart: Regular rate and rhythm  Lungs: Clear to auscultation  Abdomen: Soft nontender  Neuro: Cranial nerves II -XII intact; DTR: +2 over 4 biceps triceps brachial radialis patellar and Achilles  Spinal exam: Positive paraspinal tenderness noted bilaterally L4 5 L5-S1 with flexion extension and rotation/no bony abnormalities  Musculoskeletal:  Upper and lower extremity strength was 4/5 bilaterally  :  deferred  Skin: Warm and dry      Assessment/Plan   Diagnoses and all orders for this visit:  Multilevel lumbosacral spondylosis without myelopathy  Spinal stenosis, lumbar region, without neurogenic claudication  -     Follow Up In Pain Medicine  -     oxyCODONE-acetaminophen (Percocet) 5-325 mg tablet; Take 1 tablet by mouth every 12 hours if needed for severe pain (7 - 10).  -     Follow Up In Pain Medicine; Future  Postlaminectomy syndrome, lumbar region  -     Follow Up In Pain Medicine  -     oxyCODONE-acetaminophen (Percocet) 5-325 mg tablet; Take 1 tablet by mouth every 12 hours if needed for severe pain (7 - 10).  -     Follow Up In Pain Medicine; Future  High risk medication use  -     Follow Up In Pain Medicine  -     Follow Up In Pain  Medicine; Future  Lumbar radiculopathy     Patient was e-prescribed oxycodone 5/325 number 90 tablets or 1 every 8-12 hours as needed pain.  He he has a scheduled follow-up visit on February 12, 2025.    Mikey Taylor DO 01/02/25 4:25 PM

## 2025-02-12 ENCOUNTER — OFFICE VISIT (OUTPATIENT)
Dept: PAIN MEDICINE | Facility: CLINIC | Age: 75
End: 2025-02-12
Payer: MEDICARE

## 2025-02-12 VITALS
DIASTOLIC BLOOD PRESSURE: 85 MMHG | BODY MASS INDEX: 22.54 KG/M2 | RESPIRATION RATE: 18 BRPM | HEART RATE: 66 BPM | WEIGHT: 161 LBS | TEMPERATURE: 96.1 F | HEIGHT: 71 IN | SYSTOLIC BLOOD PRESSURE: 167 MMHG

## 2025-02-12 DIAGNOSIS — M54.16 LUMBAR RADICULOPATHY: Primary | ICD-10-CM

## 2025-02-12 PROCEDURE — 99213 OFFICE O/P EST LOW 20 MIN: CPT | Performed by: ANESTHESIOLOGY

## 2025-02-12 PROCEDURE — 1159F MED LIST DOCD IN RCRD: CPT | Performed by: ANESTHESIOLOGY

## 2025-02-12 PROCEDURE — 1036F TOBACCO NON-USER: CPT | Performed by: ANESTHESIOLOGY

## 2025-02-12 PROCEDURE — 1125F AMNT PAIN NOTED PAIN PRSNT: CPT | Performed by: ANESTHESIOLOGY

## 2025-02-12 PROCEDURE — 3079F DIAST BP 80-89 MM HG: CPT | Performed by: ANESTHESIOLOGY

## 2025-02-12 PROCEDURE — 3077F SYST BP >= 140 MM HG: CPT | Performed by: ANESTHESIOLOGY

## 2025-02-12 PROCEDURE — 1160F RVW MEDS BY RX/DR IN RCRD: CPT | Performed by: ANESTHESIOLOGY

## 2025-02-12 RX ORDER — OXYCODONE AND ACETAMINOPHEN 5; 325 MG/1; MG/1
1 TABLET ORAL EVERY 8 HOURS PRN
Qty: 90 TABLET | Refills: 0 | Status: SHIPPED | OUTPATIENT
Start: 2025-02-12 | End: 2025-03-14

## 2025-02-12 SDOH — ECONOMIC STABILITY: FOOD INSECURITY: WITHIN THE PAST 12 MONTHS, THE FOOD YOU BOUGHT JUST DIDN'T LAST AND YOU DIDN'T HAVE MONEY TO GET MORE.: NEVER TRUE

## 2025-02-12 SDOH — ECONOMIC STABILITY: FOOD INSECURITY: WITHIN THE PAST 12 MONTHS, YOU WORRIED THAT YOUR FOOD WOULD RUN OUT BEFORE YOU GOT MONEY TO BUY MORE.: NEVER TRUE

## 2025-02-12 ASSESSMENT — ENCOUNTER SYMPTOMS
OCCASIONAL FEELINGS OF UNSTEADINESS: 0
BLOOD IN STOOL: 0
SHORTNESS OF BREATH: 1
BACK PAIN: 1
DEPRESSION: 0
CONSTIPATION: 0
LOSS OF SENSATION IN FEET: 0

## 2025-02-12 ASSESSMENT — LIFESTYLE VARIABLES
SKIP TO QUESTIONS 9-10: 1
HOW OFTEN DO YOU HAVE SIX OR MORE DRINKS ON ONE OCCASION: NEVER
HOW OFTEN DO YOU HAVE A DRINK CONTAINING ALCOHOL: NEVER
HOW MANY STANDARD DRINKS CONTAINING ALCOHOL DO YOU HAVE ON A TYPICAL DAY: PATIENT DOES NOT DRINK
AUDIT-C TOTAL SCORE: 0

## 2025-02-12 ASSESSMENT — PAIN DESCRIPTION - DESCRIPTORS: DESCRIPTORS: ACHING

## 2025-02-12 ASSESSMENT — PATIENT HEALTH QUESTIONNAIRE - PHQ9
2. FEELING DOWN, DEPRESSED OR HOPELESS: NOT AT ALL
1. LITTLE INTEREST OR PLEASURE IN DOING THINGS: NOT AT ALL
SUM OF ALL RESPONSES TO PHQ9 QUESTIONS 1 AND 2: 0

## 2025-02-12 ASSESSMENT — PAIN - FUNCTIONAL ASSESSMENT: PAIN_FUNCTIONAL_ASSESSMENT: 0-10

## 2025-02-12 ASSESSMENT — PAIN SCALES - GENERAL
PAINLEVEL_OUTOF10: 8
PAINLEVEL_OUTOF10: 8

## 2025-02-12 NOTE — PROGRESS NOTES
Chief Complain  Back Pain (Had an transforamninal with Dr. Ray on 11/21 with 40% relief that lasted a few weeks./Got a steroid  from PCP with the most relief. ), Follow-up, and Med Management (Currently taking percocet and was taking them every 8 hours, newest prescription was for every 12 witch resulted in getting fewer)       History Of Present Illness  Gaurav Rehman is a 74 y.o. male here for low back pain radiating to right lower extremity . The patient rates the pain at 8-9  on a scale from 0-10.  The patient describes pain as sharp, dull, burning.  The pain is worsened by standing and walking and is alleviated by medications opioid analgesics.  Since the last visit the pain has stayed the same.  The patient denies any fever, chills, weight loss, bladder/bowel incontinence.         Past Medical History  He has a past medical history of Essential (primary) hypertension (12/18/2022).    Surgical History  He has a past surgical history that includes Other surgical history (11/16/2017); Rotator cuff repair (11/16/2017); and Back surgery (11/16/2017).     Social History  He reports that he quit smoking about 54 years ago. His smoking use included cigarettes. He has never used smokeless tobacco. He reports current alcohol use. He reports current drug use. Drug: Oxycodone.    Family History  Family History   Problem Relation Name Age of Onset    Hypertension Father      No Known Problems Mother          Allergies  Patient has no known allergies.    Review of Systems  Review of Systems   Respiratory:  Positive for shortness of breath.    Cardiovascular:  Negative for chest pain.   Gastrointestinal:  Negative for blood in stool and constipation.   Musculoskeletal:  Positive for back pain.   Psychiatric/Behavioral:  Negative for suicidal ideas.         Physical Exam  Physical Exam  Constitutional:       Appearance: Normal appearance.   HENT:      Head: Normocephalic and atraumatic.   Eyes:      Extraocular  Movements: Extraocular movements intact.      Pupils: Pupils are equal, round, and reactive to light.   Pulmonary:      Effort: Pulmonary effort is normal.   Musculoskeletal:      Cervical back: Neck supple.   Neurological:      Mental Status: He is alert.   Psychiatric:         Mood and Affect: Mood normal.          Reviewed Labs   Latest Reference Range & Units 05/15/24 11:04   GLUCOSE 74 - 99 mg/dL 101 (H)   SODIUM 136 - 145 mmol/L 141   POTASSIUM 3.5 - 5.3 mmol/L 4.3   CHLORIDE 98 - 107 mmol/L 108 (H)   Bicarbonate 21 - 32 mmol/L 25   Anion Gap 10 - 20 mmol/L 12   Blood Urea Nitrogen 6 - 23 mg/dL 43 (H)   Creatinine 0.50 - 1.30 mg/dL 1.32 (H)   EGFR >60 mL/min/1.73m*2 56 (L)   Calcium 8.6 - 10.6 mg/dL 9.3   Albumin 3.4 - 5.0 g/dL 4.4   Alkaline Phosphatase 33 - 136 U/L 47   ALT 10 - 52 U/L 16   AST 9 - 39 U/L 18   Bilirubin Total 0.0 - 1.2 mg/dL 1.0   HDL CHOLESTEROL mg/dL 42.8   Cholesterol/HDL Ratio  2.4   LDL Calculated <=99 mg/dL 35   VLDL 0 - 40 mg/dL 24   TRIGLYCERIDES 0 - 149 mg/dL 120   Non HDL Cholesterol 0 - 149 mg/dL 59   Total Protein 6.4 - 8.2 g/dL 7.3   CHOLESTEROL 0 - 199 mg/dL 102   (H): Data is abnormally high  (L): Data is abnormally low   Latest Reference Range & Units 03/21/23 11:31   WBC 4.4 - 11.3 x10E9/L 6.4   nRBC 0.0 - 0.0 /100 WBC 0.0   RBC 4.50 - 5.90 x10E12/L 3.89 (L)   HEMOGLOBIN 13.5 - 17.5 g/dL 11.3 (L)   HEMATOCRIT 41.0 - 52.0 % 34.9 (L)   MCV 80 - 100 fL 90   MCHC 32.0 - 36.0 g/dL 32.4   RED CELL DISTRIBUTION WIDTH 11.5 - 14.5 % 13.7   Platelets 150 - 450 x10E9/L 226   Neutrophils % 40.0 - 80.0 % 62.7   Immature Granulocytes %, Automated 0.0 - 0.9 % 0.3   Lymphocytes % 13.0 - 44.0 % 24.8   Monocytes % 2.0 - 10.0 % 8.5   Eosinophils % 0.0 - 6.0 % 3.1   Basophils % 0.0 - 2.0 % 0.6   Neutrophils Absolute 1.60 - 5.50 x10E9/L 3.98   Lymphocytes Absolute 0.80 - 3.00 x10E9/L 1.58   Monocytes Absolute 0.05 - 0.80 x10E9/L 0.54   Eosinophils Absolute 0.00 - 0.40 x10E9/L 0.20   Basophils  "Absolute 0.00 - 0.10 x10E9/L 0.04   (L): Data is abnormally low    Last Recorded Vitals  Blood pressure 167/85, pulse 66, temperature 35.6 °C (96.1 °F), resp. rate 18, height 1.803 m (5' 11\"), weight 73 kg (161 lb).       Assessment/Plan     Guarav Rehman is a 74 y.o. male here for follow-up of low back pain radiating to right lower extremity.  He is a Dr Bhatti patient.  We are covering from him in his absence.  Last visit he had a transforaminal epidural steroid injection with limited benefit.  He is on chronic opiate therapy with oxycodone 5 mg every 8-12 hours.  He does report significant benefit in his pain with the current regimen, helping with ADLs, household chores.  He denies any significant side effects.  No new neurological, constitutional symptoms.  Reviewed most recently done UDS which was consistent with prescribed medication.  Refill was provided to the patient.  I have personally reviewed the OARRS report.  I have considered the risks of abuse, dependence, addiction and diversion.    Blaine Valencia MD  "

## 2025-02-26 DIAGNOSIS — I10 PRIMARY HYPERTENSION: ICD-10-CM

## 2025-02-26 RX ORDER — FUROSEMIDE 20 MG/1
20 TABLET ORAL DAILY
Qty: 90 TABLET | Refills: 3 | Status: SHIPPED | OUTPATIENT
Start: 2025-02-26

## 2025-02-26 RX ORDER — AMLODIPINE BESYLATE 5 MG/1
5 TABLET ORAL DAILY
Qty: 90 TABLET | Refills: 3 | Status: SHIPPED | OUTPATIENT
Start: 2025-02-26

## 2025-03-26 ENCOUNTER — OFFICE VISIT (OUTPATIENT)
Dept: PAIN MEDICINE | Facility: CLINIC | Age: 75
End: 2025-03-26
Payer: MEDICARE

## 2025-03-26 VITALS
WEIGHT: 161 LBS | SYSTOLIC BLOOD PRESSURE: 154 MMHG | HEART RATE: 63 BPM | OXYGEN SATURATION: 98 % | TEMPERATURE: 97.2 F | DIASTOLIC BLOOD PRESSURE: 71 MMHG | HEIGHT: 71 IN | BODY MASS INDEX: 22.54 KG/M2 | RESPIRATION RATE: 18 BRPM

## 2025-03-26 DIAGNOSIS — M47.816 FACET DEGENERATION OF LUMBAR REGION: ICD-10-CM

## 2025-03-26 DIAGNOSIS — M25.511 ACUTE PAIN OF RIGHT SHOULDER: Primary | ICD-10-CM

## 2025-03-26 DIAGNOSIS — M54.16 LUMBAR RADICULOPATHY: ICD-10-CM

## 2025-03-26 DIAGNOSIS — M47.812 FACET ARTHRITIS OF CERVICAL REGION: ICD-10-CM

## 2025-03-26 DIAGNOSIS — Z79.899 HIGH RISK MEDICATION USE: ICD-10-CM

## 2025-03-26 PROCEDURE — 1125F AMNT PAIN NOTED PAIN PRSNT: CPT | Performed by: ANESTHESIOLOGY

## 2025-03-26 PROCEDURE — 3077F SYST BP >= 140 MM HG: CPT | Performed by: ANESTHESIOLOGY

## 2025-03-26 PROCEDURE — 1160F RVW MEDS BY RX/DR IN RCRD: CPT | Performed by: ANESTHESIOLOGY

## 2025-03-26 PROCEDURE — 1036F TOBACCO NON-USER: CPT | Performed by: ANESTHESIOLOGY

## 2025-03-26 PROCEDURE — 1159F MED LIST DOCD IN RCRD: CPT | Performed by: ANESTHESIOLOGY

## 2025-03-26 PROCEDURE — 99213 OFFICE O/P EST LOW 20 MIN: CPT | Performed by: ANESTHESIOLOGY

## 2025-03-26 PROCEDURE — 3078F DIAST BP <80 MM HG: CPT | Performed by: ANESTHESIOLOGY

## 2025-03-26 RX ORDER — OXYCODONE AND ACETAMINOPHEN 5; 325 MG/1; MG/1
1 TABLET ORAL EVERY 12 HOURS PRN
Qty: 60 TABLET | Refills: 0 | Status: SHIPPED | OUTPATIENT
Start: 2025-03-26 | End: 2025-04-25

## 2025-03-26 ASSESSMENT — ENCOUNTER SYMPTOMS
OCCASIONAL FEELINGS OF UNSTEADINESS: 0
DEPRESSION: 0
LOSS OF SENSATION IN FEET: 0

## 2025-03-26 ASSESSMENT — PAIN SCALES - GENERAL: PAINLEVEL_OUTOF10: 9

## 2025-03-26 NOTE — H&P (VIEW-ONLY)
Subjective   Patient ID: Gaurav Rehman is a 74 y.o. male who is here today for medication management as well is a new complaint of right shoulder pain.  He has been increasing his activity at home and has developed severe right shoulder pain.  He was seen by his PCP who recommended x-rays and an intra-articular shoulder injection.  Patient has point tenderness over his right acromioclavicular joint.    HPI  Post lumbar laminectomy syndrome; continuous opioid use for pain control; hypertension; lumbar spondylosis; acute onset of right shoulder pain associated with flexion extension and abduction with no history of trauma  Review of Systems  Positive new onset of right shoulder pain associated with increased activities of living to include bowling with no history of trauma  Objective   Physical Exam  Gen. appearance:  Patient's alert and oriented ×3 ;cooperative mild to moderate distress  Heart: Regular rate and rhythm  Lungs: Clear to auscultation  Abdomen: Soft nontender  Neuro: Cranial nerves II -XII intact; DTR: +2 over 4 biceps triceps brachial radialis patellar and Achilles  Spinal exam: Positive paraspinal tenderness noted bilaterally L4 5 L5-S1 with flexion extension and rotation/no bony abnormalities  Musculoskeletal:  Upper and lower extremity strength was 4/5 bilaterally/positive history of right AC joint tenderness with palpation  :  deferred  Skin: Warm and dry      Assessment/Plan   Diagnoses and all orders for this visit:  Acute pain of right shoulder  -     XR shoulder right 2+ views; Future  -     Joint Injection/Aspiration; Future  -     FL pain management; Future  -     oxyCODONE-acetaminophen (Percocet) 5-325 mg tablet; Take 1 tablet by mouth every 12 hours if needed for severe pain (7 - 10).  Facet degeneration of lumbar region  -     XR shoulder right 2+ views; Future  -     Joint Injection/Aspiration; Future  -     FL pain management; Future  Lumbar radiculopathy  -     FL pain management;  Future  -     oxyCODONE-acetaminophen (Percocet) 5-325 mg tablet; Take 1 tablet by mouth every 12 hours if needed for severe pain (7 - 10).  High risk medication use  -     FL pain management; Future  -     oxyCODONE-acetaminophen (Percocet) 5-325 mg tablet; Take 1 tablet by mouth every 12 hours if needed for severe pain (7 - 10).    Risk and benefits of a right intra-articular shoulder injection was discussed with the patient.  X-rays were ordered of his right shoulder as well as a refill of his medication.  He was instructed take all medication as directed and keep a secure location at all times.

## 2025-03-26 NOTE — PROGRESS NOTES
Pain #9/10 to his lower back,  right leg sciatic pain. Right shoulder pain started about a month ago, probably from bowling, Taking 2 Percocet per day and has 3 tablets left.

## 2025-03-28 ENCOUNTER — HOSPITAL ENCOUNTER (OUTPATIENT)
Dept: RADIOLOGY | Facility: HOSPITAL | Age: 75
Discharge: HOME | End: 2025-03-28
Payer: MEDICARE

## 2025-03-28 DIAGNOSIS — M25.511 ACUTE PAIN OF RIGHT SHOULDER: ICD-10-CM

## 2025-03-28 DIAGNOSIS — M47.816 FACET DEGENERATION OF LUMBAR REGION: ICD-10-CM

## 2025-03-28 PROCEDURE — 73030 X-RAY EXAM OF SHOULDER: CPT | Mod: RT

## 2025-04-21 DIAGNOSIS — I47.19 PAROXYSMAL ATRIAL TACHYCARDIA (CMS-HCC): ICD-10-CM

## 2025-04-23 ENCOUNTER — HOSPITAL ENCOUNTER (OUTPATIENT)
Dept: PAIN MEDICINE | Facility: CLINIC | Age: 75
Discharge: HOME | End: 2025-04-23
Payer: MEDICARE

## 2025-04-23 VITALS
DIASTOLIC BLOOD PRESSURE: 91 MMHG | BODY MASS INDEX: 22.45 KG/M2 | HEART RATE: 96 BPM | TEMPERATURE: 98.1 F | OXYGEN SATURATION: 94 % | RESPIRATION RATE: 16 BRPM | WEIGHT: 161 LBS | SYSTOLIC BLOOD PRESSURE: 129 MMHG

## 2025-04-23 DIAGNOSIS — M54.16 LUMBAR RADICULOPATHY: ICD-10-CM

## 2025-04-23 DIAGNOSIS — M96.1 POSTLAMINECTOMY SYNDROME, LUMBAR REGION: Primary | ICD-10-CM

## 2025-04-23 DIAGNOSIS — M47.816 FACET DEGENERATION OF LUMBAR REGION: ICD-10-CM

## 2025-04-23 DIAGNOSIS — M25.511 ACUTE PAIN OF RIGHT SHOULDER: ICD-10-CM

## 2025-04-23 DIAGNOSIS — Z79.899 HIGH RISK MEDICATION USE: ICD-10-CM

## 2025-04-23 PROCEDURE — 20610 DRAIN/INJ JOINT/BURSA W/O US: CPT | Performed by: ANESTHESIOLOGY

## 2025-04-23 PROCEDURE — 2500000004 HC RX 250 GENERAL PHARMACY W/ HCPCS (ALT 636 FOR OP/ED): Mod: JZ | Performed by: ANESTHESIOLOGY

## 2025-04-23 PROCEDURE — 2550000001 HC RX 255 CONTRASTS: Performed by: ANESTHESIOLOGY

## 2025-04-23 RX ORDER — TRIAMCINOLONE ACETONIDE 40 MG/ML
INJECTION, SUSPENSION INTRA-ARTICULAR; INTRAMUSCULAR AS NEEDED
Status: COMPLETED | OUTPATIENT
Start: 2025-04-23 | End: 2025-04-23

## 2025-04-23 RX ORDER — OXYCODONE AND ACETAMINOPHEN 5; 325 MG/1; MG/1
1 TABLET ORAL EVERY 12 HOURS PRN
Qty: 60 TABLET | Refills: 0 | Status: SHIPPED | OUTPATIENT
Start: 2025-04-23

## 2025-04-23 RX ORDER — LIDOCAINE HYDROCHLORIDE 20 MG/ML
INJECTION, SOLUTION EPIDURAL; INFILTRATION; INTRACAUDAL; PERINEURAL AS NEEDED
Status: COMPLETED | OUTPATIENT
Start: 2025-04-23 | End: 2025-04-23

## 2025-04-23 RX ORDER — SOTALOL HYDROCHLORIDE 80 MG/1
80 TABLET ORAL EVERY 12 HOURS
Qty: 180 TABLET | Refills: 3 | Status: SHIPPED | OUTPATIENT
Start: 2025-04-23

## 2025-04-23 RX ADMIN — LIDOCAINE HYDROCHLORIDE 5 ML: 20 INJECTION, SOLUTION EPIDURAL; INFILTRATION; INTRACAUDAL; PERINEURAL at 10:21

## 2025-04-23 RX ADMIN — IOHEXOL 3 ML: 240 INJECTION, SOLUTION INTRATHECAL; INTRAVASCULAR; INTRAVENOUS; ORAL at 10:21

## 2025-04-23 RX ADMIN — TRIAMCINOLONE ACETONIDE 40 MG: 40 INJECTION, SUSPENSION INTRA-ARTICULAR; INTRAMUSCULAR at 10:21

## 2025-04-23 ASSESSMENT — PAIN SCALES - GENERAL
PAINLEVEL_OUTOF10: 1
PAINLEVEL_OUTOF10: 9

## 2025-04-23 ASSESSMENT — PAIN - FUNCTIONAL ASSESSMENT
PAIN_FUNCTIONAL_ASSESSMENT: 0-10
PAIN_FUNCTIONAL_ASSESSMENT: 0-10

## 2025-04-23 ASSESSMENT — PAIN DESCRIPTION - DESCRIPTORS: DESCRIPTORS: PRESSURE

## 2025-04-23 NOTE — Clinical Note
Patient tolerated the procedure well and is comfortable with no complaints of pain. Vital signs stable. Arousable prior to transport. Patient transported to PACU via stretcher/wheelchair. Handoff completed.  16.5

## 2025-05-21 ENCOUNTER — OFFICE VISIT (OUTPATIENT)
Dept: PAIN MEDICINE | Facility: CLINIC | Age: 75
End: 2025-05-21
Payer: MEDICARE

## 2025-05-21 VITALS
HEART RATE: 63 BPM | WEIGHT: 161 LBS | TEMPERATURE: 97.9 F | DIASTOLIC BLOOD PRESSURE: 63 MMHG | BODY MASS INDEX: 22.54 KG/M2 | SYSTOLIC BLOOD PRESSURE: 142 MMHG | HEIGHT: 71 IN | OXYGEN SATURATION: 96 % | RESPIRATION RATE: 18 BRPM

## 2025-05-21 DIAGNOSIS — M96.1 POSTLAMINECTOMY SYNDROME, LUMBAR REGION: ICD-10-CM

## 2025-05-21 DIAGNOSIS — M54.16 LUMBAR RADICULOPATHY: ICD-10-CM

## 2025-05-21 DIAGNOSIS — M25.511 ACUTE PAIN OF RIGHT SHOULDER: ICD-10-CM

## 2025-05-21 DIAGNOSIS — M48.061 SPINAL STENOSIS, LUMBAR REGION, WITHOUT NEUROGENIC CLAUDICATION: Primary | ICD-10-CM

## 2025-05-21 DIAGNOSIS — Z79.899 HIGH RISK MEDICATION USE: ICD-10-CM

## 2025-05-21 PROCEDURE — 99213 OFFICE O/P EST LOW 20 MIN: CPT | Performed by: ANESTHESIOLOGY

## 2025-05-21 PROCEDURE — 1125F AMNT PAIN NOTED PAIN PRSNT: CPT | Performed by: ANESTHESIOLOGY

## 2025-05-21 PROCEDURE — 1036F TOBACCO NON-USER: CPT | Performed by: ANESTHESIOLOGY

## 2025-05-21 PROCEDURE — 1159F MED LIST DOCD IN RCRD: CPT | Performed by: ANESTHESIOLOGY

## 2025-05-21 PROCEDURE — 3077F SYST BP >= 140 MM HG: CPT | Performed by: ANESTHESIOLOGY

## 2025-05-21 PROCEDURE — 3078F DIAST BP <80 MM HG: CPT | Performed by: ANESTHESIOLOGY

## 2025-05-21 PROCEDURE — 1160F RVW MEDS BY RX/DR IN RCRD: CPT | Performed by: ANESTHESIOLOGY

## 2025-05-21 RX ORDER — OXYCODONE AND ACETAMINOPHEN 5; 325 MG/1; MG/1
1 TABLET ORAL EVERY 8 HOURS PRN
Qty: 90 TABLET | Refills: 0 | Status: SHIPPED | OUTPATIENT
Start: 2025-05-21 | End: 2025-06-20

## 2025-05-21 ASSESSMENT — ENCOUNTER SYMPTOMS
OCCASIONAL FEELINGS OF UNSTEADINESS: 0
DEPRESSION: 0
LOSS OF SENSATION IN FEET: 0

## 2025-05-21 ASSESSMENT — PAIN SCALES - GENERAL: PAINLEVEL_OUTOF10: 9

## 2025-05-21 NOTE — PROGRESS NOTES
Pain #9/10 to right upper arm muscle following bowling.  Had Right shoulder injection on 4-23-25 which gave him almost 100% pain relief but hurt it 5-10-25 while bowling.  Takes 2 Percocet per day and has 2 tablets left.

## 2025-05-21 NOTE — H&P
History Of Present Illness  Gaurav Rehman is a 75 y.o. male presenting with a chief complaint of chronic continuous opioid use for pain control.  Patient has had a previous lumbar laminectomy and right rotator cuff surgery 15 years ago.     Past Medical History  He has a past medical history of Essential (primary) hypertension (12/18/2022).    Surgical History  He has a past surgical history that includes Other surgical history (11/16/2017); Rotator cuff repair (11/16/2017); and Back surgery (11/16/2017).     Social History  He reports that he quit smoking about 54 years ago. His smoking use included cigarettes. He has never used smokeless tobacco. He reports current alcohol use. He reports current drug use. Drug: Oxycodone.    Family History  Family History[1]     Allergies  Patient has no known allergies.    Review of Systems  Patient's, complained of right shoulder pain again after reinjuring himself playing bowling  Physical Exam  Gen. appearance:  Patient's alert and oriented ×3 ;cooperative mild to moderate distress  Heart: Regular rate and rhythm  Lungs: Clear to auscultation  Abdomen: Soft nontender  Neuro: Cranial nerves II -XII intact; DTR: +2 over 4 biceps triceps brachial radialis patellar and Achilles  Spinal exam: Positive paraspinal tenderness noted bilaterally L4 5 L5-S1 with flexion extension and rotation/no bony abnormalities  Musculoskeletal:  Upper and lower extremity strength was 4/5 bilaterally  :  deferred  Skin: Warm and dry    Last Recorded Vitals  /63   Pulse 63   Temp 36.6 °C (97.9 °F)   Resp 18   Wt 73 kg (161 lb)   SpO2 96%     ASSESSMENT:  1.  Post lumbar laminectomy syndrome  2.  History of right rotator cuff repair  3.  Continuous opioid use for pain control  4.  High risk for opioid abuse    PLAN:  I advised the patient to not continue the strenuous bowling which reinjured his right shoulder.  He was specifically told that I would not treat his right shoulder pain when  he knows he has had a rotator cuff repair in the past and had nearly 90% pain relief after it injection until he really strained it bowling.  Follow-up visit scheduled in 6 weeks      Mikey Taylor DO         [1]   Family History  Problem Relation Name Age of Onset    Hypertension Father      No Known Problems Mother

## 2025-05-24 DIAGNOSIS — E78.5 HYPERLIPIDEMIA, UNSPECIFIED HYPERLIPIDEMIA TYPE: ICD-10-CM

## 2025-05-27 RX ORDER — ROSUVASTATIN CALCIUM 40 MG/1
40 TABLET, COATED ORAL DAILY
Qty: 90 TABLET | Refills: 3 | Status: SHIPPED | OUTPATIENT
Start: 2025-05-27

## 2025-06-09 ENCOUNTER — APPOINTMENT (OUTPATIENT)
Dept: CARDIOLOGY | Facility: CLINIC | Age: 75
End: 2025-06-09
Payer: MEDICARE

## 2025-06-09 ENCOUNTER — HOSPITAL ENCOUNTER (OUTPATIENT)
Dept: CARDIOLOGY | Facility: CLINIC | Age: 75
Discharge: HOME | End: 2025-06-09
Payer: MEDICARE

## 2025-06-09 VITALS
HEIGHT: 71 IN | BODY MASS INDEX: 23.94 KG/M2 | HEART RATE: 62 BPM | OXYGEN SATURATION: 97 % | SYSTOLIC BLOOD PRESSURE: 130 MMHG | WEIGHT: 171 LBS | DIASTOLIC BLOOD PRESSURE: 66 MMHG

## 2025-06-09 DIAGNOSIS — I07.1 MODERATE TRICUSPID REGURGITATION: ICD-10-CM

## 2025-06-09 DIAGNOSIS — I71.21 ANEURYSM OF ASCENDING AORTA WITHOUT RUPTURE: Primary | ICD-10-CM

## 2025-06-09 DIAGNOSIS — I10 PRIMARY HYPERTENSION: ICD-10-CM

## 2025-06-09 DIAGNOSIS — I34.0 MODERATE MITRAL REGURGITATION: ICD-10-CM

## 2025-06-09 DIAGNOSIS — R53.82 CHRONIC FATIGUE: ICD-10-CM

## 2025-06-09 DIAGNOSIS — E78.5 HYPERLIPIDEMIA, UNSPECIFIED HYPERLIPIDEMIA TYPE: ICD-10-CM

## 2025-06-09 DIAGNOSIS — I35.1 MILD AORTIC REGURGITATION: ICD-10-CM

## 2025-06-09 DIAGNOSIS — I71.21 ANEURYSM OF ASCENDING AORTA WITHOUT RUPTURE: ICD-10-CM

## 2025-06-09 DIAGNOSIS — R06.02 SHORTNESS OF BREATH: ICD-10-CM

## 2025-06-09 DIAGNOSIS — I47.19 PAROXYSMAL ATRIAL TACHYCARDIA: ICD-10-CM

## 2025-06-09 PROCEDURE — 3078F DIAST BP <80 MM HG: CPT | Performed by: INTERNAL MEDICINE

## 2025-06-09 PROCEDURE — 99214 OFFICE O/P EST MOD 30 MIN: CPT | Performed by: INTERNAL MEDICINE

## 2025-06-09 PROCEDURE — 1159F MED LIST DOCD IN RCRD: CPT | Performed by: INTERNAL MEDICINE

## 2025-06-09 PROCEDURE — 93306 TTE W/DOPPLER COMPLETE: CPT | Performed by: INTERNAL MEDICINE

## 2025-06-09 PROCEDURE — 0932T N-INVS DET HRT FAIL AUG ECHO: CPT

## 2025-06-09 PROCEDURE — 1036F TOBACCO NON-USER: CPT | Performed by: INTERNAL MEDICINE

## 2025-06-09 PROCEDURE — 3075F SYST BP GE 130 - 139MM HG: CPT | Performed by: INTERNAL MEDICINE

## 2025-06-09 NOTE — PATIENT INSTRUCTIONS
Your echocardiogram today shows normal heart function.    Your ascending aorta has not changed in size.    No changes or additions to your medications.

## 2025-06-09 NOTE — PROGRESS NOTES
"Subjective   Gaurav Rehman is a 75 y.o. male.    Chief Complaint:  Follow-up coronary disease, aortic aneurysm, valvular heart disease.    HPI    His chief complaint is that of fatigue.  He feels that he fatigues more easily.  Denies any anginal symptoms.  Gets some exertional dyspnea.  No orthopnea.  Under some stress at home because his wife has developed sleepwalking issues.    Cardiac problems include moderate mitral regurgitation, moderate tricuspid regurgitation, supraventricular tachycardia, ascending aortic aneurysm, mild aortic regurgitation.     The patient has a history of sinus of Valsalva aneurysm. This measures around 4.7 cm. This has remained unchanged. He has a paroxysmal atrial tachycardia which is well controlled on sotalol therapy. He has a history of hypertension and hyperlipidemia.     He has a past history of a paroxysmal atrial tachycardia. He was referred for ablation a few years ago. However this was not successful. He was started on sotalol therapy and has been on sotalol therapy for a few years.     He has a history of complex congenital heart repair in 1995. He had a repair of ventricular septal defect. He had treatment of pulmonic stenosis. He had aortic root surgery with implantation of the coronary arteries.     Allergies  Medication    · No Known Drug Allergies     Family History  Mother    · Family history of emphysema (V17.6) (Z82.5)   · Family history of throat cancer (V16.0) (Z80.0)  Father    · Family history of throat cancer (V16.0) (Z80.0)     Social History  Problems    · Former smoker (V15.82) (Z87.891)   ·    · No illicit drug use   · Occasional alcohol use        Review of Systems   Cardiovascular:  Positive for dyspnea on exertion.   Musculoskeletal:  Positive for arthritis, back pain and joint pain.      Current Medications[1]     Visit Vitals  /66 (BP Location: Left arm)   Pulse 62   Ht 1.803 m (5' 11\")   Wt 77.6 kg (171 lb)   SpO2 97%   BMI 23.85 kg/m² "   Smoking Status Former   BSA 1.97 m²        Objective     Constitutional:       Appearance: Not in distress.   Neck:      Vascular: JVD normal.   Pulmonary:      Breath sounds: Normal breath sounds.   Cardiovascular:      Normal rate. Regular rhythm. S1 with normal intensity. S2 with normal intensity.       Murmurs: There is a grade 2/6 systolic murmur.      No gallop.    Pulses:     Intact distal pulses.   Edema:     Peripheral edema absent.   Abdominal:      General: Bowel sounds are normal.   Neurological:      Mental Status: Alert and oriented to person, place and time.         Lab Review:   Lab Results   Component Value Date     05/15/2024    K 4.3 05/15/2024     (H) 05/15/2024    CO2 25 05/15/2024    BUN 43 (H) 05/15/2024    CREATININE 1.32 (H) 05/15/2024    GLUCOSE 101 (H) 05/15/2024    CALCIUM 9.3 05/15/2024     Lab Results   Component Value Date    WBC 6.4 03/21/2023    HGB 11.3 (L) 03/21/2023    HCT 34.9 (L) 03/21/2023    MCV 90 03/21/2023     03/21/2023     Lab Results   Component Value Date    CHOL 102 05/15/2024    TRIG 120 05/15/2024    HDL 42.8 05/15/2024       Assessment:    1.  Coronary artery disease.  Mild on the basis of a review of his CT scan of the chest done in 2023.  Medical therapy.    2.  Sinus of Valsalva aneurysm.  A preliminary review of the echocardiographic study shows no progression of this aneurysm.    3.  Paroxysmal atrial tachycardia.  On sotalol therapy and has not had reoccurrence of this arrhythmia.    4.  Mitral regurgitation.  Mild to moderate today on his echo study.    5.  Hyperlipidemia.  Cholesterol 102, HDL 43, LDL 35.    6.  Renal insufficiency.  Creatinine have ranged around 1.3.  Also followed by primary care.         [1]   Current Outpatient Medications   Medication Sig Dispense Refill    acetaminophen (Tylenol) 500 mg tablet Take by mouth every 6 hours if needed.      amLODIPine (Norvasc) 5 mg tablet TAKE 1 TABLET EVERY DAY 90 tablet 3     cetirizine (ZyrTEC) 10 mg tablet Take 1 tablet (10 mg) by mouth once daily. As needed      furosemide (Lasix) 20 mg tablet TAKE 1 TABLET EVERY DAY 90 tablet 3    losartan (Cozaar) 100 mg tablet Take 1 tablet (100 mg) by mouth once daily. 90 tablet 3    oxyCODONE-acetaminophen (Percocet) 5-325 mg tablet Take 1 tablet by mouth every 8 hours if needed for severe pain (7 - 10). 90 tablet 0    rosuvastatin (Crestor) 40 mg tablet TAKE 1 TABLET ONE TIME DAILY 90 tablet 3    sildenafil (Viagra) 100 mg tablet Take by mouth if needed.      sotalol (Betapace) 80 mg tablet TAKE 1 TABLET EVERY 12 HOURS 180 tablet 3     No current facility-administered medications for this visit.

## 2025-06-09 NOTE — LETTER
June 9, 2025     Don Mustafa MD  [id number] [facility] [id number]    Patient: Gaurav Rehman   YOB: 1950   Date of Visit: 6/9/2025       Dear Dr. Don Mustafa MD:    Thank you for referring Gaurav Rehman to me for evaluation. Below are my notes for this consultation.  If you have questions, please do not hesitate to call me. I look forward to following your patient along with you.       Sincerely,     Jeffry Toro MD      CC: No Recipients  ______________________________________________________________________________________    Subjective  Gaurav Rehman is a 75 y.o. male.    Chief Complaint:  Follow-up coronary disease, aortic aneurysm, valvular heart disease.    HPI    His chief complaint is that of fatigue.  He feels that he fatigues more easily.  Denies any anginal symptoms.  Gets some exertional dyspnea.  No orthopnea.  Under some stress at home because his wife has developed sleepwalking issues.    Cardiac problems include moderate mitral regurgitation, moderate tricuspid regurgitation, supraventricular tachycardia, ascending aortic aneurysm, mild aortic regurgitation.     The patient has a history of sinus of Valsalva aneurysm. This measures around 4.7 cm. This has remained unchanged. He has a paroxysmal atrial tachycardia which is well controlled on sotalol therapy. He has a history of hypertension and hyperlipidemia.     He has a past history of a paroxysmal atrial tachycardia. He was referred for ablation a few years ago. However this was not successful. He was started on sotalol therapy and has been on sotalol therapy for a few years.     He has a history of complex congenital heart repair in 1995. He had a repair of ventricular septal defect. He had treatment of pulmonic stenosis. He had aortic root surgery with implantation of the coronary arteries.     Allergies  Medication    · No Known Drug Allergies     Family History  Mother    · Family history of emphysema (V17.6)  "(Z82.5)   · Family history of throat cancer (V16.0) (Z80.0)  Father    · Family history of throat cancer (V16.0) (Z80.0)     Social History  Problems    · Former smoker (V15.82) (Z87.891)   ·    · No illicit drug use   · Occasional alcohol use        Review of Systems   Cardiovascular:  Positive for dyspnea on exertion.   Musculoskeletal:  Positive for arthritis, back pain and joint pain.      Current Medications[1]     Visit Vitals  /66 (BP Location: Left arm)   Pulse 62   Ht 1.803 m (5' 11\")   Wt 77.6 kg (171 lb)   SpO2 97%   BMI 23.85 kg/m²   Smoking Status Former   BSA 1.97 m²        Objective    Constitutional:       Appearance: Not in distress.   Neck:      Vascular: JVD normal.   Pulmonary:      Breath sounds: Normal breath sounds.   Cardiovascular:      Normal rate. Regular rhythm. S1 with normal intensity. S2 with normal intensity.       Murmurs: There is a grade 2/6 systolic murmur.      No gallop.    Pulses:     Intact distal pulses.   Edema:     Peripheral edema absent.   Abdominal:      General: Bowel sounds are normal.   Neurological:      Mental Status: Alert and oriented to person, place and time.         Lab Review:   Lab Results   Component Value Date     05/15/2024    K 4.3 05/15/2024     (H) 05/15/2024    CO2 25 05/15/2024    BUN 43 (H) 05/15/2024    CREATININE 1.32 (H) 05/15/2024    GLUCOSE 101 (H) 05/15/2024    CALCIUM 9.3 05/15/2024     Lab Results   Component Value Date    WBC 6.4 03/21/2023    HGB 11.3 (L) 03/21/2023    HCT 34.9 (L) 03/21/2023    MCV 90 03/21/2023     03/21/2023     Lab Results   Component Value Date    CHOL 102 05/15/2024    TRIG 120 05/15/2024    HDL 42.8 05/15/2024       Assessment:    1.  Coronary artery disease.  Mild on the basis of a review of his CT scan of the chest done in 2023.  Medical therapy.    2.  Sinus of Valsalva aneurysm.  A preliminary review of the echocardiographic study shows no progression of this aneurysm.    3.  " Paroxysmal atrial tachycardia.  On sotalol therapy and has not had reoccurrence of this arrhythmia.    4.  Mitral regurgitation.  Mild to moderate today on his echo study.    5.  Hyperlipidemia.  Cholesterol 102, HDL 43, LDL 35.    6.  Renal insufficiency.  Creatinine have ranged around 1.3.  Also followed by primary care.           [1]  Current Outpatient Medications   Medication Sig Dispense Refill   • acetaminophen (Tylenol) 500 mg tablet Take by mouth every 6 hours if needed.     • amLODIPine (Norvasc) 5 mg tablet TAKE 1 TABLET EVERY DAY 90 tablet 3   • cetirizine (ZyrTEC) 10 mg tablet Take 1 tablet (10 mg) by mouth once daily. As needed     • furosemide (Lasix) 20 mg tablet TAKE 1 TABLET EVERY DAY 90 tablet 3   • losartan (Cozaar) 100 mg tablet Take 1 tablet (100 mg) by mouth once daily. 90 tablet 3   • oxyCODONE-acetaminophen (Percocet) 5-325 mg tablet Take 1 tablet by mouth every 8 hours if needed for severe pain (7 - 10). 90 tablet 0   • rosuvastatin (Crestor) 40 mg tablet TAKE 1 TABLET ONE TIME DAILY 90 tablet 3   • sildenafil (Viagra) 100 mg tablet Take by mouth if needed.     • sotalol (Betapace) 80 mg tablet TAKE 1 TABLET EVERY 12 HOURS 180 tablet 3     No current facility-administered medications for this visit.

## 2025-06-10 LAB
AORTIC VALVE MEAN GRADIENT: 6 MMHG
AORTIC VALVE PEAK VELOCITY: 1.61 M/S
AV PEAK GRADIENT: 10 MMHG
AVA (PEAK VEL): 5.55 CM2
AVA (VTI): 5.48 CM2
BODY SURFACE AREA: 1.97 M2
EJECTION FRACTION APICAL 4 CHAMBER: 58.2
EJECTION FRACTION: 63 %
LEFT ATRIUM VOLUME AREA LENGTH INDEX BSA: 63.2 ML/M2
LEFT VENTRICLE INTERNAL DIMENSION DIASTOLE: 5.25 CM (ref 3.5–6)
LEFT VENTRICULAR OUTFLOW TRACT DIAMETER: 3.01 CM
MITRAL VALVE E/A RATIO: 0.83
RIGHT VENTRICLE FREE WALL PEAK S': 11 CM/S
RIGHT VENTRICLE PEAK SYSTOLIC PRESSURE: 32 MMHG
TRICUSPID ANNULAR PLANE SYSTOLIC EXCURSION: 1.7 CM

## 2025-07-03 ENCOUNTER — OFFICE VISIT (OUTPATIENT)
Dept: PAIN MEDICINE | Facility: CLINIC | Age: 75
End: 2025-07-03
Payer: MEDICARE

## 2025-07-03 VITALS
SYSTOLIC BLOOD PRESSURE: 174 MMHG | HEIGHT: 71 IN | WEIGHT: 171 LBS | BODY MASS INDEX: 23.94 KG/M2 | RESPIRATION RATE: 18 BRPM | DIASTOLIC BLOOD PRESSURE: 79 MMHG | HEART RATE: 61 BPM | OXYGEN SATURATION: 98 % | TEMPERATURE: 99.1 F

## 2025-07-03 DIAGNOSIS — M96.1 POSTLAMINECTOMY SYNDROME, LUMBAR REGION: ICD-10-CM

## 2025-07-03 DIAGNOSIS — M47.817 MULTILEVEL LUMBOSACRAL SPONDYLOSIS WITHOUT MYELOPATHY: ICD-10-CM

## 2025-07-03 DIAGNOSIS — M48.061 SPINAL STENOSIS, LUMBAR REGION, WITHOUT NEUROGENIC CLAUDICATION: ICD-10-CM

## 2025-07-03 DIAGNOSIS — M54.16 LUMBAR RADICULOPATHY: Primary | ICD-10-CM

## 2025-07-03 DIAGNOSIS — M47.816 FACET DEGENERATION OF LUMBAR REGION: ICD-10-CM

## 2025-07-03 DIAGNOSIS — Z79.899 HIGH RISK MEDICATION USE: ICD-10-CM

## 2025-07-03 PROCEDURE — 99213 OFFICE O/P EST LOW 20 MIN: CPT | Performed by: ANESTHESIOLOGY

## 2025-07-03 PROCEDURE — 1159F MED LIST DOCD IN RCRD: CPT | Performed by: ANESTHESIOLOGY

## 2025-07-03 PROCEDURE — 3077F SYST BP >= 140 MM HG: CPT | Performed by: ANESTHESIOLOGY

## 2025-07-03 PROCEDURE — 3078F DIAST BP <80 MM HG: CPT | Performed by: ANESTHESIOLOGY

## 2025-07-03 PROCEDURE — 1125F AMNT PAIN NOTED PAIN PRSNT: CPT | Performed by: ANESTHESIOLOGY

## 2025-07-03 PROCEDURE — 1160F RVW MEDS BY RX/DR IN RCRD: CPT | Performed by: ANESTHESIOLOGY

## 2025-07-03 PROCEDURE — 1036F TOBACCO NON-USER: CPT | Performed by: ANESTHESIOLOGY

## 2025-07-03 RX ORDER — OXYCODONE AND ACETAMINOPHEN 5; 325 MG/1; MG/1
1 TABLET ORAL EVERY 8 HOURS PRN
Qty: 90 TABLET | Refills: 0 | Status: SHIPPED | OUTPATIENT
Start: 2025-07-03 | End: 2025-08-02

## 2025-07-03 RX ORDER — OXYCODONE AND ACETAMINOPHEN 5; 325 MG/1; MG/1
1 TABLET ORAL EVERY 8 HOURS PRN
Qty: 90 TABLET | Refills: 0 | Status: SHIPPED | OUTPATIENT
Start: 2025-07-03 | End: 2025-07-03

## 2025-07-03 ASSESSMENT — ENCOUNTER SYMPTOMS
LOSS OF SENSATION IN FEET: 0
OCCASIONAL FEELINGS OF UNSTEADINESS: 0
DEPRESSION: 0

## 2025-07-03 ASSESSMENT — COLUMBIA-SUICIDE SEVERITY RATING SCALE - C-SSRS
2. HAVE YOU ACTUALLY HAD ANY THOUGHTS OF KILLING YOURSELF?: NO
1. IN THE PAST MONTH, HAVE YOU WISHED YOU WERE DEAD OR WISHED YOU COULD GO TO SLEEP AND NOT WAKE UP?: NO
6. HAVE YOU EVER DONE ANYTHING, STARTED TO DO ANYTHING, OR PREPARED TO DO ANYTHING TO END YOUR LIFE?: NO

## 2025-07-03 ASSESSMENT — PAIN SCALES - GENERAL: PAINLEVEL_OUTOF10: 8

## 2025-07-03 NOTE — H&P
History Of Present Illness  Gaurav Rehman is a 75 y.o. male presenting with a chief complaint of chronic low back pain secondary to failed laminectomy syndrome.  Patient moderates his pain by taking oxycodone 5/325 mg tablets 2 times a day with excellent relief of over 75% of his chronic pain.  Patient's OARRS report shows his active cumulated morphine equivalent of 15.8.  Pill count today is 2 tablets remaining.  Patient is pleasant cooperative in no severe distress he was walking with a nonantalgic gait and denies bladder or bowel dysfunction     Past Medical History  He has a past medical history of Essential (primary) hypertension (12/18/2022).    Surgical History  He has a past surgical history that includes Other surgical history (11/16/2017); Rotator cuff repair (11/16/2017); and Back surgery (11/16/2017).     Social History  He reports that he quit smoking about 54 years ago. His smoking use included cigarettes. He has never used smokeless tobacco. He reports current alcohol use. He reports current drug use. Drug: Oxycodone.    Family History  Family History[1]     Allergies  Patient has no known allergies.    Review of Systems  Unremarkable except for chief complaint  Physical Exam  Gen. appearance:  Patient's alert and oriented ×3 ;cooperative mild to moderate distress  Heart: Regular rate and rhythm  Lungs: Clear to auscultation  Abdomen: Soft nontender  Neuro: Cranial nerves II -XII intact; DTR: +2 over 4 biceps triceps brachial radialis patellar and Achilles  Spinal exam: Positive paraspinal tenderness noted bilaterally L4 5 L5-S1 with flexion extension and rotation/no bony abnormalities  Musculoskeletal:  Upper and lower extremity strength was 4/5 bilaterally  :  deferred  Skin: Warm and dry    Last Recorded Vitals  /79   Pulse 61   Temp 37.3 °C (99.1 °F)   Resp 18   Wt 77.6 kg (171 lb)   SpO2 98%     ASSESSMENT:  1.  Post lumbar laminectomy syndrome  2.  Spinal canal stenosis  3.   Lumbosacral radiculopathy  4.  Continuous use of opioids for pain control  5.  High risk for opioid abuse  6.  Lumbar spondylosis without myelopathy    PLAN:  Patient was e-prescribed oxycodone 5/325 mg tablets #90, 1 p.o. every 8 hours as needed pain.  He was instructed take all medication as directed and keep a secure location at all times.  I highly encouraged him to continue home strengthening exercises as well as aerobic therapy.  A follow-up visit scheduled on 8/14/2025.  Patient was advised to call if he had any further problems needing more immediate attention.      Mikey Taylor DO         [1]   Family History  Problem Relation Name Age of Onset    Hypertension Father      No Known Problems Mother

## 2025-07-03 NOTE — PROGRESS NOTES
Pain #8/10 lower back, right sciatic pain.  Right shoulder with pain but is manageable.  Takes 2 Percocet per day and has 2 tablets left.

## 2025-08-14 ENCOUNTER — OFFICE VISIT (OUTPATIENT)
Dept: PAIN MEDICINE | Facility: CLINIC | Age: 75
End: 2025-08-14
Payer: MEDICARE

## 2025-08-14 VITALS
TEMPERATURE: 98.8 F | RESPIRATION RATE: 16 BRPM | BODY MASS INDEX: 23.85 KG/M2 | DIASTOLIC BLOOD PRESSURE: 71 MMHG | SYSTOLIC BLOOD PRESSURE: 158 MMHG | OXYGEN SATURATION: 95 % | HEART RATE: 68 BPM | WEIGHT: 171 LBS

## 2025-08-14 DIAGNOSIS — M96.1 POSTLAMINECTOMY SYNDROME, LUMBAR REGION: ICD-10-CM

## 2025-08-14 DIAGNOSIS — M48.061 SPINAL STENOSIS, LUMBAR REGION, WITHOUT NEUROGENIC CLAUDICATION: ICD-10-CM

## 2025-08-14 DIAGNOSIS — Z79.899 HIGH RISK MEDICATION USE: ICD-10-CM

## 2025-08-14 DIAGNOSIS — Z79.891 OPIATE ANALGESIC CONTRACT EXISTS: Primary | ICD-10-CM

## 2025-08-14 DIAGNOSIS — M19.011 OSTEOARTHRITIS OF RIGHT SHOULDER, UNSPECIFIED OSTEOARTHRITIS TYPE: ICD-10-CM

## 2025-08-14 PROCEDURE — 3077F SYST BP >= 140 MM HG: CPT | Performed by: ANESTHESIOLOGY

## 2025-08-14 PROCEDURE — 1160F RVW MEDS BY RX/DR IN RCRD: CPT | Performed by: ANESTHESIOLOGY

## 2025-08-14 PROCEDURE — 1159F MED LIST DOCD IN RCRD: CPT | Performed by: ANESTHESIOLOGY

## 2025-08-14 PROCEDURE — 3078F DIAST BP <80 MM HG: CPT | Performed by: ANESTHESIOLOGY

## 2025-08-14 PROCEDURE — 99213 OFFICE O/P EST LOW 20 MIN: CPT | Performed by: ANESTHESIOLOGY

## 2025-08-14 PROCEDURE — 99212 OFFICE O/P EST SF 10 MIN: CPT | Performed by: ANESTHESIOLOGY

## 2025-08-14 PROCEDURE — 1125F AMNT PAIN NOTED PAIN PRSNT: CPT | Performed by: ANESTHESIOLOGY

## 2025-08-14 RX ORDER — OXYCODONE AND ACETAMINOPHEN 5; 325 MG/1; MG/1
TABLET ORAL
COMMUNITY
End: 2025-08-14 | Stop reason: SDUPTHER

## 2025-08-14 RX ORDER — OXYCODONE AND ACETAMINOPHEN 5; 325 MG/1; MG/1
1 TABLET ORAL EVERY 8 HOURS PRN
Qty: 90 TABLET | Refills: 0 | Status: SHIPPED | OUTPATIENT
Start: 2025-08-14 | End: 2025-09-13

## 2025-08-14 ASSESSMENT — ENCOUNTER SYMPTOMS
DEPRESSION: 0
OCCASIONAL FEELINGS OF UNSTEADINESS: 0
LOSS OF SENSATION IN FEET: 0

## 2025-08-14 ASSESSMENT — PAIN SCALES - GENERAL
PAINLEVEL_OUTOF10: 8
PAINLEVEL_OUTOF10: 8

## 2025-08-14 ASSESSMENT — PAIN DESCRIPTION - DESCRIPTORS: DESCRIPTORS: SHARP;ACHING;BURNING

## 2025-08-14 ASSESSMENT — PAIN - FUNCTIONAL ASSESSMENT: PAIN_FUNCTIONAL_ASSESSMENT: 0-10

## 2025-08-16 LAB
1OH-MIDAZOLAM UR-MCNC: NEGATIVE NG/ML
7AMINOCLONAZEPAM UR-MCNC: NEGATIVE NG/ML
A-OH ALPRAZ UR-MCNC: NEGATIVE NG/ML
A-OH-TRIAZOLAM UR-MCNC: NEGATIVE NG/ML
AMPHETAMINES UR QL: NEGATIVE NG/ML
BARBITURATES UR QL: NEGATIVE NG/ML
BZE UR QL: NEGATIVE NG/ML
CODEINE UR-MCNC: NEGATIVE NG/ML
CREAT UR-MCNC: 41 MG/DL
DRUG SCREEN COMMENT UR-IMP: ABNORMAL
EDDP UR-MCNC: NEGATIVE NG/ML
FENTANYL UR-MCNC: ABNORMAL NG/ML
HYDROCODONE UR-MCNC: NEGATIVE NG/ML
HYDROMORPHONE UR-MCNC: NEGATIVE NG/ML
LORAZEPAM UR-MCNC: NEGATIVE NG/ML
METHADONE UR-MCNC: NEGATIVE NG/ML
MORPHINE UR-MCNC: NEGATIVE NG/ML
NORDIAZEPAM UR-MCNC: NEGATIVE NG/ML
NORFENTANYL UR-MCNC: ABNORMAL NG/ML
NORHYDROCODONE UR CFM-MCNC: NEGATIVE NG/ML
NOROXYCODONE UR CFM-MCNC: 409 NG/ML
NORTRAMADOL UR-MCNC: NEGATIVE NG/ML
OH-ETHYLFLURAZ UR-MCNC: NEGATIVE NG/ML
OXAZEPAM UR-MCNC: NEGATIVE NG/ML
OXIDANTS UR QL: NEGATIVE MCG/ML
OXYCODONE UR CFM-MCNC: 331 NG/ML
OXYMORPHONE UR CFM-MCNC: 555 NG/ML
PCP UR QL: NEGATIVE NG/ML
PH UR: 5.4 [PH] (ref 4.5–9)
QUEST 6 ACETYLMORPHINE: ABNORMAL
QUEST NOTES AND COMMENTS: ABNORMAL
QUEST PATIENT HISTORICAL REPORT: ABNORMAL
QUEST ZOLPIDEM: ABNORMAL
TEMAZEPAM UR-MCNC: NEGATIVE NG/ML
THC UR QL: NEGATIVE NG/ML
TRAMADOL UR-MCNC: NEGATIVE NG/ML
ZOLPIDEM PHENYL-4-CARB UR CFM-MCNC: ABNORMAL NG/ML

## 2025-08-20 LAB
1OH-MIDAZOLAM UR-MCNC: NEGATIVE NG/ML
7AMINOCLONAZEPAM UR-MCNC: NEGATIVE NG/ML
A-OH ALPRAZ UR-MCNC: NEGATIVE NG/ML
A-OH-TRIAZOLAM UR-MCNC: NEGATIVE NG/ML
AMPHETAMINES UR QL: NEGATIVE NG/ML
BARBITURATES UR QL: NEGATIVE NG/ML
BZE UR QL: NEGATIVE NG/ML
CODEINE UR-MCNC: NEGATIVE NG/ML
CREAT UR-MCNC: 41 MG/DL
DRUG SCREEN COMMENT UR-IMP: ABNORMAL
EDDP UR-MCNC: NEGATIVE NG/ML
FENTANYL UR-MCNC: NEGATIVE NG/ML
HYDROCODONE UR-MCNC: NEGATIVE NG/ML
HYDROMORPHONE UR-MCNC: NEGATIVE NG/ML
LORAZEPAM UR-MCNC: NEGATIVE NG/ML
METHADONE UR-MCNC: NEGATIVE NG/ML
MORPHINE UR-MCNC: NEGATIVE NG/ML
NORDIAZEPAM UR-MCNC: NEGATIVE NG/ML
NORFENTANYL UR-MCNC: NEGATIVE NG/ML
NORHYDROCODONE UR CFM-MCNC: NEGATIVE NG/ML
NOROXYCODONE UR CFM-MCNC: 409 NG/ML
NORTRAMADOL UR-MCNC: NEGATIVE NG/ML
OH-ETHYLFLURAZ UR-MCNC: NEGATIVE NG/ML
OXAZEPAM UR-MCNC: NEGATIVE NG/ML
OXIDANTS UR QL: NEGATIVE MCG/ML
OXYCODONE UR CFM-MCNC: 331 NG/ML
OXYMORPHONE UR CFM-MCNC: 555 NG/ML
PCP UR QL: NEGATIVE NG/ML
PH UR: 5.4 [PH] (ref 4.5–9)
QUEST 6 ACETYLMORPHINE: NEGATIVE NG/ML
QUEST NOTES AND COMMENTS: ABNORMAL
QUEST ZOLPIDEM: NEGATIVE NG/ML
TEMAZEPAM UR-MCNC: NEGATIVE NG/ML
THC UR QL: NEGATIVE NG/ML
TRAMADOL UR-MCNC: NEGATIVE NG/ML
ZOLPIDEM PHENYL-4-CARB UR CFM-MCNC: NEGATIVE NG/ML

## 2025-08-22 DIAGNOSIS — I10 PRIMARY HYPERTENSION: ICD-10-CM

## 2025-08-22 RX ORDER — LOSARTAN POTASSIUM 100 MG/1
100 TABLET ORAL DAILY
Qty: 90 TABLET | Refills: 3 | Status: SHIPPED | OUTPATIENT
Start: 2025-08-22

## 2025-12-15 ENCOUNTER — APPOINTMENT (OUTPATIENT)
Dept: CARDIOLOGY | Facility: CLINIC | Age: 75
End: 2025-12-15
Payer: MEDICARE

## 2025-12-22 ENCOUNTER — APPOINTMENT (OUTPATIENT)
Age: 75
End: 2025-12-22
Payer: MEDICARE